# Patient Record
Sex: FEMALE | Race: WHITE | NOT HISPANIC OR LATINO | Employment: UNEMPLOYED | URBAN - METROPOLITAN AREA
[De-identification: names, ages, dates, MRNs, and addresses within clinical notes are randomized per-mention and may not be internally consistent; named-entity substitution may affect disease eponyms.]

---

## 2017-01-27 ENCOUNTER — HOSPITAL ENCOUNTER (EMERGENCY)
Facility: MEDICAL CENTER | Age: 57
End: 2017-01-27
Attending: EMERGENCY MEDICINE
Payer: MEDICAID

## 2017-01-27 VITALS
HEIGHT: 65 IN | OXYGEN SATURATION: 95 % | DIASTOLIC BLOOD PRESSURE: 49 MMHG | TEMPERATURE: 98.6 F | HEART RATE: 75 BPM | SYSTOLIC BLOOD PRESSURE: 128 MMHG | RESPIRATION RATE: 18 BRPM | BODY MASS INDEX: 37.47 KG/M2 | WEIGHT: 224.87 LBS

## 2017-01-27 DIAGNOSIS — I82.4Y2 ACUTE DEEP VEIN THROMBOSIS (DVT) OF PROXIMAL VEIN OF LEFT LOWER EXTREMITY (HCC): ICD-10-CM

## 2017-01-27 LAB
ANION GAP SERPL CALC-SCNC: 5 MMOL/L (ref 0–11.9)
ANISOCYTOSIS BLD QL SMEAR: ABNORMAL
BASOPHILS # BLD AUTO: 0.3 % (ref 0–1.8)
BASOPHILS # BLD: 0.03 K/UL (ref 0–0.12)
BUN SERPL-MCNC: 11 MG/DL (ref 8–22)
CALCIUM SERPL-MCNC: 8.6 MG/DL (ref 8.5–10.5)
CHLORIDE SERPL-SCNC: 106 MMOL/L (ref 96–112)
CO2 SERPL-SCNC: 27 MMOL/L (ref 20–33)
COMMENT 1642: NORMAL
CREAT SERPL-MCNC: 0.62 MG/DL (ref 0.5–1.4)
EOSINOPHIL # BLD AUTO: 0.21 K/UL (ref 0–0.51)
EOSINOPHIL NFR BLD: 2.2 % (ref 0–6.9)
ERYTHROCYTE [DISTWIDTH] IN BLOOD BY AUTOMATED COUNT: 46.8 FL (ref 35.9–50)
GFR SERPL CREATININE-BSD FRML MDRD: >60 ML/MIN/1.73 M 2
GLUCOSE SERPL-MCNC: 95 MG/DL (ref 65–99)
HCT VFR BLD AUTO: 31.2 % (ref 37–47)
HGB BLD-MCNC: 9.3 G/DL (ref 12–16)
IMM GRANULOCYTES # BLD AUTO: 0.03 K/UL (ref 0–0.11)
IMM GRANULOCYTES NFR BLD AUTO: 0.3 % (ref 0–0.9)
INR PPP: 0.99 (ref 0.87–1.13)
LYMPHOCYTES # BLD AUTO: 1.45 K/UL (ref 1–4.8)
LYMPHOCYTES NFR BLD: 15.2 % (ref 22–41)
MCH RBC QN AUTO: 21 PG (ref 27–33)
MCHC RBC AUTO-ENTMCNC: 29.8 G/DL (ref 33.6–35)
MCV RBC AUTO: 70.6 FL (ref 81.4–97.8)
MICROCYTES BLD QL SMEAR: ABNORMAL
MONOCYTES # BLD AUTO: 0.43 K/UL (ref 0–0.85)
MONOCYTES NFR BLD AUTO: 4.5 % (ref 0–13.4)
MORPHOLOGY BLD-IMP: NORMAL
NEUTROPHILS # BLD AUTO: 7.38 K/UL (ref 2–7.15)
NEUTROPHILS NFR BLD: 77.5 % (ref 44–72)
NRBC # BLD AUTO: 0 K/UL
NRBC BLD AUTO-RTO: 0 /100 WBC
PLATELET # BLD AUTO: 230 K/UL (ref 164–446)
PLATELET BLD QL SMEAR: NORMAL
PMV BLD AUTO: 10.7 FL (ref 9–12.9)
POTASSIUM SERPL-SCNC: 3.6 MMOL/L (ref 3.6–5.5)
PROTHROMBIN TIME: 13.4 SEC (ref 12–14.6)
RBC # BLD AUTO: 4.42 M/UL (ref 4.2–5.4)
RBC BLD AUTO: PRESENT
SODIUM SERPL-SCNC: 138 MMOL/L (ref 135–145)
WBC # BLD AUTO: 9.5 K/UL (ref 4.8–10.8)

## 2017-01-27 PROCEDURE — 85610 PROTHROMBIN TIME: CPT

## 2017-01-27 PROCEDURE — 700102 HCHG RX REV CODE 250 W/ 637 OVERRIDE(OP): Performed by: EMERGENCY MEDICINE

## 2017-01-27 PROCEDURE — 93971 EXTREMITY STUDY: CPT

## 2017-01-27 PROCEDURE — 36415 COLL VENOUS BLD VENIPUNCTURE: CPT

## 2017-01-27 PROCEDURE — 93971 EXTREMITY STUDY: CPT | Mod: 26 | Performed by: SURGERY

## 2017-01-27 PROCEDURE — A9270 NON-COVERED ITEM OR SERVICE: HCPCS | Performed by: EMERGENCY MEDICINE

## 2017-01-27 PROCEDURE — 99284 EMERGENCY DEPT VISIT MOD MDM: CPT

## 2017-01-27 PROCEDURE — 80048 BASIC METABOLIC PNL TOTAL CA: CPT

## 2017-01-27 PROCEDURE — 85025 COMPLETE CBC W/AUTO DIFF WBC: CPT

## 2017-01-27 RX ORDER — TRAZODONE HYDROCHLORIDE 50 MG/1
100 TABLET ORAL DAILY
Status: SHIPPED | COMMUNITY
End: 2022-02-18 | Stop reason: SDUPTHER

## 2017-01-27 RX ORDER — SERTRALINE HYDROCHLORIDE 100 MG/1
100 TABLET, FILM COATED ORAL DAILY
COMMUNITY
End: 2018-01-19

## 2017-01-27 RX ADMIN — RIVAROXABAN 15 MG: 15 TABLET, FILM COATED ORAL at 18:05

## 2017-01-27 ASSESSMENT — PAIN SCALES - GENERAL: PAINLEVEL_OUTOF10: 5

## 2017-01-27 NOTE — ED PROVIDER NOTES
ED Provider Note    Scribed for Jabari Rodriguez M.D. by Margarita Perez. 1/27/2017, 1:45 PM.    Primary care provider: Jennifer Lee M.D.  Means of arrival: Walk-In  History obtained from: Patient  History limited by: None    CHIEF COMPLAINT  Chief Complaint   Patient presents with   • Leg Pain     left leg, +swelling, onset this am   • Post-Op Complications     hernia repair 4d ago   • Sent by MD     by Thania, r/o dvt       HPI  Luz Ahumada is a 57 y.o. female who presents to the Emergency Department sent by her MD for left leg pain with associated swelling, onset this morning.  The patient says that her leg gave out on her this morning when she got out of bed secondary to the pain. She denies any previous injuries to the legs. She is 4 days post hernia repair and spoke with Dr. Monteiro who told her to come to the ED for evaluate of a DVT. She denies any history of DVT. She is not on any blood thinners. She denies any chronic past medical history or daily medication.     REVIEW OF SYSTEMS  See HPI for further details. All other systems are negative.     PAST MEDICAL HISTORY       SURGICAL HISTORY   has past surgical history that includes gastric bypass laparoscopic (7/2/08); cholecystectomy; hernia repair; bowel resection laparoscopic (7/12/08); and ventral hernia repair laparoscopic (10/29/08).    SOCIAL HISTORY  Social History   Substance Use Topics   • Smoking status: Former Smoker   • Smokeless tobacco: None   • Alcohol Use: No      History   Drug Use No       FAMILY HISTORY  History reviewed. No pertinent family history.    CURRENT MEDICATIONS  No current facility-administered medications on file prior to encounter.     Current Outpatient Prescriptions on File Prior to Encounter   Medication Sig Dispense Refill   • albuterol (VENTOLIN OR PROVENTIL) 108 (90 BASE) MCG/ACT AERS Inhale 1-2 Puffs by mouth every 6 hours as needed for Shortness of Breath. 1 Inhaler 0   • sertraline (ZOLOFT) 50 MG TABS Take 50 mg  "by mouth every day.     • benzonatate (TESSALON PERLES) 100 MG CAPS Take 1 Cap by mouth 2 times a day as needed for Cough. 20 Cap 0   • oxycodone-acetaminophen (PERCOCET) 5-325 MG TABS Take 1-2 Tabs by mouth every four hours as needed for Mild Pain. 20 Each 0   Reviewed. See Encounter Summary.     ALLERGIES  Allergies   Allergen Reactions   • Hydrocodone-Acetaminophen Hives     Pt states \" I hallucinate \"        PHYSICAL EXAM  VITAL SIGNS: /49 mmHg  Pulse 77  Temp(Src) 37 °C (98.6 °F) (Temporal)  Resp 18  Ht 1.651 m (5' 5\")  Wt 102 kg (224 lb 13.9 oz)  BMI 37.42 kg/m2  SpO2 96%   Pulse ox interpretation: I interpret this pulse ox as normal.  Constitutional: Alert in no apparent distress.  HENT: No signs of trauma, Bilateral external ears normal, Nose normal.   Eyes: Pupils are equal and reactive, Conjunctiva normal, Non-icteric.   Neck: Normal range of motion, No tenderness, Supple, No stridor. No JVD.  Lymphatic: No lymphadenopathy noted.   Cardiovascular: Regular rate and rhythm, no murmurs.   Thorax & Lungs: Normal breath sounds, No respiratory distress, No wheezing, No chest tenderness.   Abdomen: Bowel sounds normal, Soft, No tenderness, No masses, No pulsatile masses. No peritoneal signs.  Skin: Warm, Dry, No erythema, No rash.   Back: No bony tenderness, No CVA tenderness.   Extremities: left calf tenderness to palpation. No obvious asymmetry. Intact distal pulses, No edema, No tenderness,   Musculoskeletal: Good range of motion in all major joints. No tenderness to palpation or major deformities noted.   Neurologic: Alert , Normal motor function, Normal sensory function, No focal deficits noted.   Psychiatric: Affect normal, Judgment normal, Mood normal.     DIAGNOSTIC STUDIES / PROCEDURES     LABS  Results for orders placed or performed during the hospital encounter of 01/27/17   CBC WITH DIFFERENTIAL   Result Value Ref Range    WBC 9.5 4.8 - 10.8 K/uL    RBC 4.42 4.20 - 5.40 M/uL    Hemoglobin " 9.3 (L) 12.0 - 16.0 g/dL    Hematocrit 31.2 (L) 37.0 - 47.0 %    MCV 70.6 (L) 81.4 - 97.8 fL    MCH 21.0 (L) 27.0 - 33.0 pg    MCHC 29.8 (L) 33.6 - 35.0 g/dL    RDW 46.8 35.9 - 50.0 fL    Platelet Count 230 164 - 446 K/uL    MPV 10.7 9.0 - 12.9 fL    Neutrophils-Polys 77.50 (H) 44.00 - 72.00 %    Lymphocytes 15.20 (L) 22.00 - 41.00 %    Monocytes 4.50 0.00 - 13.40 %    Eosinophils 2.20 0.00 - 6.90 %    Basophils 0.30 0.00 - 1.80 %    Immature Granulocytes 0.30 0.00 - 0.90 %    Nucleated RBC 0.00 /100 WBC    Neutrophils (Absolute) 7.38 (H) 2.00 - 7.15 K/uL    Lymphs (Absolute) 1.45 1.00 - 4.80 K/uL    Monos (Absolute) 0.43 0.00 - 0.85 K/uL    Eos (Absolute) 0.21 0.00 - 0.51 K/uL    Baso (Absolute) 0.03 0.00 - 0.12 K/uL    Immature Granulocytes (abs) 0.03 0.00 - 0.11 K/uL    NRBC (Absolute) 0.00 K/uL    Anisocytosis 1+     Microcytosis 1+    BASIC METABOLIC PANEL   Result Value Ref Range    Sodium 138 135 - 145 mmol/L    Potassium 3.6 3.6 - 5.5 mmol/L    Chloride 106 96 - 112 mmol/L    Co2 27 20 - 33 mmol/L    Glucose 95 65 - 99 mg/dL    Bun 11 8 - 22 mg/dL    Creatinine 0.62 0.50 - 1.40 mg/dL    Calcium 8.6 8.5 - 10.5 mg/dL    Anion Gap 5.0 0.0 - 11.9   PROTHROMBIN TIME   Result Value Ref Range    PT 13.4 12.0 - 14.6 sec    INR 0.99 0.87 - 1.13   ESTIMATED GFR   Result Value Ref Range    GFR If African American >60 >60 mL/min/1.73 m 2    GFR If Non African American >60 >60 mL/min/1.73 m 2   PERIPHERAL SMEAR REVIEW   Result Value Ref Range    Peripheral Smear Review see below    PLATELET ESTIMATE   Result Value Ref Range    Plt Estimation Normal    MORPHOLOGY   Result Value Ref Range    RBC Morphology Present    DIFFERENTIAL COMMENT   Result Value Ref Range    Comments-Diff see below    All labs were reviewed by me.    RADIOLOGY  LE VENOUS DUPLEX (DVT)   Preliminary Result      The radiologist's interpretation of all radiological studies have been reviewed by me.    COURSE & MEDICAL DECISION MAKING  Pertinent Labs &  Imaging studies reviewed. (See chart for details)    **Patient w/ LLE DVT after recent surgery. No cp/sob and low suspicion for PE. Creatinine stable. D/w surgeon and reports low risk for bleeding in regards to recent surgery. Will treat outpatient with Xarelto,however, very strict return instructions provided and patient verbalized understanding of risk of bleeding and concerning symptoms to return to ED for. Patient reports hx of anemia and no recent labs available. No hx of GI bleeding and no dark/blood per rectum. Advised to monitor stools for any sign of blood or black stools.     1:45 PM Patient seen and examined at bedside. Ordered for CBC with differential, BMP, and prothrombin time to evaluate. She was informed that an ultrasound of the leg will be ordered to evaluate for a DVT. She was further informed that general lab work will be ordered to further evaluate her symptoms. She understood and verbalized agreement.      4:51 PM - Ordered for LE venous duplex    5:05 PM Patient reevaluated and is resting comfortably. She had no further complaints at this time. She was informed that she does have a DVT and will need to be started on blood thinners. She was informed that Dr. Monteiro will be contacted to see if he would like her admitted.     5:08 PM Paged Dr. Monteiro, general surgery.      5:23 PM I spoke with Dr. Monteiro and informed him about the patient. He states to start the patient on blood thinners and that there is no risk for a bleed in regards to the surgery performed and she is able to be discharged home and to follow up with him in the office.    Patient left the ED without any active distress. Strict return instructions provided and urge immediate return to the ED with any concerning symptoms. Questions and concerns addressed with patient.    The patient will return for new or worsening symptoms and is stable at the time of discharge.    The patient is referred to a primary physician for blood pressure  management, diabetic screening, and for all other preventative health concerns.    DISPOSITION:  Patient will be discharged home in stable condition.    FOLLOW UP:  Celina Sanchez M.D.  [id number]  [address]  [place] [id number]  [phone]    In 2 days      OUTPATIENT MEDICATIONS:  New Prescriptions    RIVAROXABAN (XARELTO) 15 MG TAB TABLET    Take 1 Tab by mouth 2 times a day, with meals for 21 days.     FINAL IMPRESSION  1. Acute deep vein thrombosis (DVT) of proximal vein of left lower extremity (CMS-[facility])          Margarita SEARS (Scribe), am scribing for, and in the presence of, Jabari Rodriguez M.D..    Electronically signed by: Margarita Perez (Jesicaibchio), 1/27/2017    Jabari SEARS M.D. personally performed the services described in this documentation, as scribed by Margarita Perez in my presence, and it is both accurate and complete.    The note accurately reflects work and decisions made by me.  Jabari Rodriguez  1/27/2017  7:20 PM

## 2017-01-27 NOTE — ED AVS SNAPSHOT
After Visit Summary                                                                                                                Luz Ahumada   MRN: [id number]    Department:  [facility], Emergency Dept   Date of Visit:  1/27/2017            [facility], Emergency Dept    [address]    [place] [address]    Phone:  [phone]      You were seen by     Jabari Rodriguez M.D.      Your Diagnosis Was     Acute deep vein thrombosis (DVT) of proximal vein of left lower extremity (CMS-HCC)     I82.4Y2       These are the medications you received during your hospitalization from 01/27/2017 1120 to 01/27/2017 1811     Date/Time Order Dose Route Action    01/27/2017 1805 rivaroxaban (XARELTO) tablet 15 mg 15 mg Oral Given      Follow-up Information     1. Follow up with Celina Sanchez M.D. In 2 days.    Specialty:  Family Medicine    Contact information    [id number]  [place] [address]  [phone]        Medication Information     Review all of your home medications and newly ordered medications with your primary doctor and/or pharmacist as soon as possible. Follow medication instructions as directed by your doctor and/or pharmacist.     Please keep your complete medication list with you and share with your physician. Update the information when medications are discontinued, doses are changed, or new medications (including over-the-counter products) are added; and carry medication information at all times in the event of emergency situations.               Medication List      START taking these medications        Instructions    rivaroxaban 15 MG Tabs tablet   Commonly known as:  XARELTO    Take 1 Tab by mouth 2 times a day, with meals for 21 days.   Dose:  15 mg         ASK your doctor about these medications        Instructions    albuterol 108 (90 BASE) MCG/ACT Aers inhalation aerosol    Inhale 1-2 Puffs by mouth every 6 hours as needed for Shortness of  Breath.   Dose:  1-2 Puff       benzonatate 100 MG Caps   Commonly known as:  TESSALON PERLES    Take 1 Cap by mouth 2 times a day as needed for Cough.   Dose:  100 mg       oxycodone-acetaminophen 5-325 MG Tabs   Commonly known as:  PERCOCET    Take 1-2 Tabs by mouth every four hours as needed for Mild Pain.   Dose:  1-2 Tab       sertraline 100 MG Tabs   Commonly known as:  ZOLOFT    Take 100 mg by mouth every day.   Dose:  100 mg       trazodone 50 MG Tabs   Commonly known as:  DESYREL    Take 100 mg by mouth every day.   Dose:  100 mg               Procedures and tests performed during your visit     BASIC METABOLIC PANEL    CBC WITH DIFFERENTIAL    DIFFERENTIAL COMMENT    ESTIMATED GFR    LE VENOUS DUPLEX (DVT)    MORPHOLOGY    PERIPHERAL SMEAR REVIEW    PLATELET ESTIMATE    PROTHROMBIN TIME        Discharge Instructions       Return to the Emergency Department with any chest pain, shortness of breath, abdominal pain or any sign of bleeding.    Deep Vein Thrombosis  A deep vein thrombosis (DVT) is a blood clot that develops in the deep, larger veins of the leg, arm, or pelvis. These are more dangerous than clots that might form in veins near the surface of the body. A DVT can lead to serious and even life-threatening complications if the clot breaks off and travels in the bloodstream to the lungs.   A DVT can damage the valves in your leg veins so that instead of flowing upward, the blood pools in the lower leg. This is called post-thrombotic syndrome, and it can result in pain, swelling, discoloration, and sores on the leg.  CAUSES  Usually, several things contribute to the formation of blood clots. Contributing factors include:  · The flow of blood slows down.  · The inside of the vein is damaged in some way.  · You have a condition that makes blood clot more easily.  RISK FACTORS  Some people are more likely than others to develop blood clots. Risk factors include:   · Smoking.  · Being overweight  (obese).  · Sitting or lying still for a long time. This includes long-distance travel, paralysis, or recovery from an illness or surgery.  Other factors that increase risk are:   · Older age, especially over 75 years of age.  · Having a family history of blood clots or if you have already had a blot clot.  · Having major or lengthy surgery. This is especially true for surgery on the hip, knee, or belly (abdomen). Hip surgery is particularly high risk.  · Having a long, thin tube (catheter) placed inside a vein during a medical procedure.  · Breaking a hip or leg.  · Having cancer or cancer treatment.  · Pregnancy and childbirth.  ¨ Hormone changes make the blood clot more easily during pregnancy.  ¨ The fetus puts pressure on the veins of the pelvis.  ¨ There is a risk of injury to veins during delivery or a caesarean delivery. The risk is highest just after childbirth.  · Medicines containing the female hormone estrogen. This includes birth control pills and hormone replacement therapy.  · Other circulation or heart problems.    SIGNS AND SYMPTOMS  When a clot forms, it can either partially or totally block the blood flow in that vein. Symptoms of a DVT can include:  · Swelling of the leg or arm, especially if one side is much worse.  · Warmth and redness of the leg or arm, especially if one side is much worse.  · Pain in an arm or leg. If the clot is in the leg, symptoms may be more noticeable or worse when standing or walking.  The symptoms of a DVT that has traveled to the lungs (pulmonary embolism, PE) usually start suddenly and include:  · Shortness of breath.  · Coughing.  · Coughing up blood or blood-tinged mucus.  · Chest pain. The chest pain is often worse with deep breaths.  · Rapid heartbeat.  Anyone with these symptoms should get emergency medical treatment right away. Do not wait to see if the symptoms will go away. Call your local emergency services (911 in the [place] if you have these symptoms. Do  not drive yourself to the hospital.  DIAGNOSIS  If a DVT is suspected, your health care provider will take a full medical history and perform a physical exam. Tests that also may be required include:  · Blood tests, including studies of the clotting properties of the blood.  · Ultrasound to see if you have clots in your legs or lungs.  · X-rays to show the flow of blood when dye is injected into the veins (venogram).  · Studies of your lungs if you have any chest symptoms.  PREVENTION  · Exercise the legs regularly. Take a brisk 30-minute walk every day.  · Maintain a weight that is appropriate for your height.  · Avoid sitting or lying in bed for long periods of time without moving your legs.  · Women, particularly those over the age of 35 years, should consider the risks and benefits of taking estrogen medicines, including birth control pills.  · Do not smoke, especially if you take estrogen medicines.  · Long-distance travel can increase your risk of DVT. You should exercise your legs by walking or pumping the muscles every hour.  · Many of the risk factors above relate to situations that exist with hospitalization, either for illness, injury, or elective surgery. Prevention may include medical and nonmedical measures.  ¨ Your health care provider will assess you for the need for venous thromboembolism prevention when you are admitted to the hospital. If you are having surgery, your surgeon will assess you the day of or day after surgery.  TREATMENT  Once identified, a DVT can be treated. It can also be prevented in some circumstances. Once you have had a DVT, you may be at increased risk for a DVT in the future. The most common treatment for DVT is blood-thinning (anticoagulant) medicine, which reduces the blood's tendency to clot. Anticoagulants can stop new blood clots from forming and stop old clots from growing. They cannot dissolve existing clots. Your body does this by itself over time. Anticoagulants can  be given by mouth, through an IV tube, or by injection. Your health care provider will determine the best program for you. Other medicines or treatments that may be used are:  · Heparin or related medicines (low molecular weight heparin) are often the first treatment for a blood clot. They act quickly. However, they cannot be taken orally and must be given either in shot form or by IV tube.  ¨ Heparin can cause a fall in a component of blood that stops bleeding and forms blood clots (platelets). You will be monitored with blood tests to be sure this does not occur.  · Warfarin is an anticoagulant that can be swallowed. It takes a few days to start working, so usually heparin or related medicines are used in combination. Once warfarin is working, heparin is usually stopped.  · Factor Xa inhibitor medicines, such as rivaroxaban and apixaban, also reduce blood clotting. These medicines are taken orally and can often be used without heparin or related medicines.  · Less commonly, clot dissolving drugs (thrombolytics) are used to dissolve a DVT. They carry a high risk of bleeding, so they are used mainly in severe cases where your life or a part of your body is threatened.  · Very rarely, a blood clot in the leg needs to be removed surgically.  · If you are unable to take anticoagulants, your health care provider may arrange for you to have a filter placed in a main vein in your abdomen. This filter prevents clots from traveling to your lungs.  HOME CARE INSTRUCTIONS  · Take all medicines as directed by your health care provider.  · Learn as much as you can about DVT.  · Wear a medical alert bracelet or carry a medical alert card.  · Ask your health care provider how soon you can go back to normal activities. It is important to stay active to prevent blood clots. If you are on anticoagulant medicine, avoid contact sports.  · It is very important to exercise. This is especially important while traveling, sitting, or  standing for long periods of time. Exercise your legs by walking or by tightening and relaxing your leg muscles regularly. Take frequent walks.  · You may need to wear compression stockings. These are tight elastic stockings that apply pressure to the lower legs. This pressure can help keep the blood in the legs from clotting.  Taking Warfarin  Warfarin is a daily medicine that is taken by mouth. Your health care provider will advise you on the length of treatment (usually 3-6 months, sometimes lifelong). If you take warfarin:  · Understand how to take warfarin and foods that can affect how warfarin works in your body.  · Too much and too little warfarin are both dangerous. Too much warfarin increases the risk of bleeding. Too little warfarin continues to allow the risk for blood clots.  Warfarin and Regular Blood Testing  While taking warfarin, you will need to have regular blood tests to measure your blood clotting time. These blood tests usually include both the prothrombin time (PT) and international normalized ratio (INR) tests. The PT and INR results allow your health care provider to adjust your dose of warfarin. It is very important that you have your PT and INR tested as often as directed by your health care provider.     Warfarin and Your Diet  Avoid major changes in your diet, or notify your health care provider before changing your diet. Arrange a visit with a registered dietitian to answer your questions. Many foods, especially foods high in vitamin K, can interfere with warfarin and affect the PT and INR results. You should eat a consistent amount of foods high in vitamin K. Foods high in vitamin K include:   · Spinach, kale, broccoli, cabbage, suzi and turnip greens, [place] sprouts, peas, cauliflower, seaweed, and parsley.  · Beef and pork liver.  · Green tea.  · Soybean oil.  Warfarin with Other Medicines  Many medicines can interfere with warfarin and affect the PT and INR results. You  must:  · Tell your health care provider about any and all medicines, vitamins, and supplements you take, including aspirin and other over-the-counter anti-inflammatory medicines. Be especially cautious with aspirin and anti-inflammatory medicines. Ask your health care provider before taking these.  · Do not take or discontinue any prescribed or over-the-counter medicine except on the advice of your health care provider or pharmacist.  Warfarin Side Effects  Warfarin can have side effects, such as easy bruising and difficulty stopping bleeding. Ask your health care provider or pharmacist about other side effects of warfarin. You will need to:  · Hold pressure over cuts for longer than usual.  · Notify your dentist and other health care providers that you are taking warfarin before you undergo any procedures where bleeding may occur.  Warfarin with Alcohol and Tobacco   · Drinking alcohol frequently can increase the effect of warfarin, leading to excess bleeding. It is best to avoid alcoholic drinks or to consume only very small amounts while taking warfarin. Notify your health care provider if you change your alcohol intake.    · Do not use any tobacco products including cigarettes, chewing tobacco, or electronic cigarettes. If you smoke, quit. Ask your health care provider for help with quitting smoking.  Alternative Medicines to Warfarin: Factor Xa Inhibitor Medicines  · These blood-thinning medicines are taken by mouth, usually for several weeks or longer. It is important to take the medicine every single day at the same time each day.  · There are no regular blood tests required when using these medicines.  · There are fewer food and drug interactions than with warfarin.  · The side effects of this class of medicine are similar to those of warfarin, including excessive bruising or bleeding. Ask your health care provider or pharmacist about other potential side effects.  SEEK MEDICAL CARE IF:  · You notice a rapid  heartbeat.  · You feel weaker or more tired than usual.  · You feel faint.  · You notice increased bruising.  · You feel your symptoms are not getting better in the time expected.  · You believe you are having side effects of medicine.  SEEK IMMEDIATE MEDICAL CARE IF:  · You have chest pain.  · You have trouble breathing.  · You have new or increased swelling or pain in one leg.  · You cough up blood.  · You notice blood in vomit, in a bowel movement, or in urine.  MAKE SURE YOU:  · Understand these instructions.  · Will watch your condition.  · Will get help right away if you are not doing well or get worse.     This information is not intended to replace advice given to you by your health care provider. Make sure you discuss any questions you have with your health care provider.     Document Released: 12/18/2006 Document Revised: 01/08/2016 Document Reviewed: 04/13/2016  [employer] Patient Education ©2016 [employer]            Patient Information     Patient Information    Following emergency treatment: all patient requiring follow-up care must return either to a private physician or a clinic if your condition worsens before you are able to obtain further medical attention, please return to the emergency room.     Billing Information    At [facility], we work to make the billing process streamlined for our patients.  Our Representatives are here to answer any questions you may have regarding your hospital bill.  If you have insurance coverage and have supplied your insurance information to us, we will submit a claim to your insurer on your behalf.  Should you have any questions regarding your bill, we can be reached online or by phone as follows:  Online: You are able pay your bills online or live chat with our representatives about any billing questions you may have. We are here to help Monday - Friday from 8:00am to 7:30pm and 9:00am - 12:00pm on Saturdays.  Please visit  [email or web address]  for more information.   Phone:  [phone] or [phone]    Please note that your emergency physician, surgeon, pathologist, radiologist, anesthesiologist, and other specialists are not employed by [facility] and will therefore bill separately for their services.  Please contact them directly for any questions concerning their bills at the numbers below:     Emergency Physician Services:  [phone]  [facility]:  [phone]  [facility]:  [phone]  [facility]:  [phone]    1. Your final bill may vary from the amount quoted upon discharge if all procedures are not complete at that time, or if your doctor has additional procedures of which we are not aware. You will receive an additional bill if you return to the Emergency Department at [facility] for suture removal regardless of the facility of which the sutures were placed.     2. Please arrange for settlement of this account at the emergency registration.    3. All self-pay accounts are due in full at the time of treatment.  If you are unable to meet this obligation then payment is expected within 4-5 days.     4. If you have had radiology studies (CT, X-ray, Ultrasound, MRI), you have received a preliminary result during your emergency department visit. Please contact the radiology department [phone] to receive a copy of your final result. Please discuss the Final result with your primary physician or with the follow up physician provided.     Crisis Hotline:  [place] Crisis Hotline:  3-498-LYZIOVC or 1-797.633.1421  [place] Crisis Hotline:    1-839.617.5725 or 334-190-5649         ED Discharge Follow Up Questions    1. In order to provide you with very good care, we would like to follow up with a phone call in the next few days.  May we have your permission to contact you?     YES /  NO    2. What is the best phone number to call  you? (       )_____-__________    3. What is the best time to call you?      Morning  /  Afternoon  /  Evening                   Patient Signature:  ____________________________________________________________    Date:  ____________________________________________________________

## 2017-01-27 NOTE — ED NOTES
Pt to triage in WC.  Chief Complaint   Patient presents with   • Leg Pain     left leg, +swelling, onset this am   • Post-Op Complications     hernia repair 4d ago   • Sent by MD     by Thania, r/o dvt     Left foot warm +pedal pulse.  Pt asked to wait in lobby. Advised of wait time and triage process. Advised to alert RN w/ any changes in condition. Thanked for patience.

## 2017-01-27 NOTE — ED NOTES
Med rec reviewed and complete per pt at bedside.  Allergies reviewed.  Pt states she finished a Z-cooper 10 days ago.

## 2017-01-27 NOTE — ED NOTES
".  Chief Complaint   Patient presents with   • Leg Pain     left leg, +swelling, onset this am   • Post-Op Complications     hernia repair 4d ago   • Sent by MD     by Thania, r/o dvt   Sudden onset this morning at 0900. Pain below left knee A/P. Pt not able to bear weight LLE. Pt states \" it just feels really tender.\" + DP pulse noted. Pt reports distal numbness/tingling to LLE. No chest pain. No difficulty breathing.     "

## 2017-01-27 NOTE — ED AVS SNAPSHOT
1/27/2017          Luz Ahumada  [address]  [place] [address]    Dear Luz:    [facility] wants to ensure your discharge home is safe and you or your loved ones have had all your questions answered regarding your care after you leave the hospital.    You may receive a telephone call within two days of your discharge.  This call is to make certain you understand your discharge instructions as well as ensure we provided you with the best care possible during your stay with us.     The call will only last approximately 3-5 minutes and will be done by a nurse.    Once again, we want to ensure your discharge home is safe and that you have a clear understanding of any next steps in your care.  If you have any questions or concerns, please do not hesitate to contact us, we are here for you.  Thank you for choosing [facility] for your healthcare needs.    Sincerely,    Ranjit Kenny    [facility] [place]

## 2017-01-27 NOTE — ED AVS SNAPSHOT
[employer] Access Code: [email or web address]  Expires: 2/26/2017  6:11 PM    Your email address is not on file at [facility].  Email Addresses are required for you to sign up for [employer], please contact [phone] to verify your personal information and to provide your email address prior to attempting to register for [employer]-um[employer].    Luz Ahumada  [address]  [place] [address]    H-umust  A secure, online tool to manage your health information     [facility]’s [employer]® is a secure, online tool that connects you to your personalized health information from the privacy of your home -- day or night - making it very easy for you to manage your healthcare. Once the activation process is completed, you can even access your medical information using the [employer] laura, which is available for free in the Apple Laura store or Google Play store.     To learn more about [employer], visit [email or web address]    There are two levels of access available (as shown below):   My Chart Features  [facility] Primary Care Doctor [facility]  Specialists [facility]  Urgent  Care Non-[facility] Primary Care Doctor   Email your healthcare team securely and privately 24/7 X X X    Manage appointments: schedule your next appointment; view details of past/upcoming appointments X      Request prescription refills. X      View recent personal medical records, including lab and immunizations X X X X   View health record, including health history, allergies, medications X X X X   Read reports about your outpatient visits, procedures, consult and ER notes X X X X   See your discharge summary, which is a recap of your hospital and/or ER visit that includes your diagnosis, lab results, and care plan X X  X     How to register for H-umust:  Once your e-mail address has been verified, follow the following steps to sign up for H-umust.     1. Go to  [email or web address]  2. Click on the Sign Up Now box, which takes you to the New Member Sign Up  page. You will need to provide the following information:  a. Enter your [employer] Access Code exactly as it appears at the top of this page. (You will not need to use this code after you’ve completed the sign-up process. If you do not sign up before the expiration date, you must request a new code.)   b. Enter your date of birth.   c. Enter your home email address.   d. Click Submit, and follow the next screen’s instructions.  3. Create a [employer] ID. This will be your [employer] login ID and cannot be changed, so think of one that is secure and easy to remember.  4. Create a [employer] password. You can change your password at any time.  5. Enter your Password Reset Question and Answer. This can be used at a later time if you forget your password.   6. Enter your e-mail address. This allows you to receive e-mail notifications when new information is available in [employer].  7. Click Sign Up. You can now view your health information.    For assistance activating your [employer] account, call [phone]

## 2017-01-28 NOTE — ED NOTES
All lines and monitors disconnected.  Discharge instructions reviewed, questions answered.  Pt to lobby via wheelchair, escorted by RN.  Pt states all belongings in possession.

## 2017-01-28 NOTE — DISCHARGE INSTRUCTIONS
Return to the Emergency Department with any chest pain, shortness of breath, abdominal pain or any sign of bleeding.    Deep Vein Thrombosis  A deep vein thrombosis (DVT) is a blood clot that develops in the deep, larger veins of the leg, arm, or pelvis. These are more dangerous than clots that might form in veins near the surface of the body. A DVT can lead to serious and even life-threatening complications if the clot breaks off and travels in the bloodstream to the lungs.   A DVT can damage the valves in your leg veins so that instead of flowing upward, the blood pools in the lower leg. This is called post-thrombotic syndrome, and it can result in pain, swelling, discoloration, and sores on the leg.  CAUSES  Usually, several things contribute to the formation of blood clots. Contributing factors include:  · The flow of blood slows down.  · The inside of the vein is damaged in some way.  · You have a condition that makes blood clot more easily.  RISK FACTORS  Some people are more likely than others to develop blood clots. Risk factors include:   · Smoking.  · Being overweight (obese).  · Sitting or lying still for a long time. This includes long-distance travel, paralysis, or recovery from an illness or surgery.  Other factors that increase risk are:   · Older age, especially over 75 years of age.  · Having a family history of blood clots or if you have already had a blot clot.  · Having major or lengthy surgery. This is especially true for surgery on the hip, knee, or belly (abdomen). Hip surgery is particularly high risk.  · Having a long, thin tube (catheter) placed inside a vein during a medical procedure.  · Breaking a hip or leg.  · Having cancer or cancer treatment.  · Pregnancy and childbirth.  ¨ Hormone changes make the blood clot more easily during pregnancy.  ¨ The fetus puts pressure on the veins of the pelvis.  ¨ There is a risk of injury to veins during delivery or a caesarean delivery. The risk is  highest just after childbirth.  · Medicines containing the female hormone estrogen. This includes birth control pills and hormone replacement therapy.  · Other circulation or heart problems.    SIGNS AND SYMPTOMS  When a clot forms, it can either partially or totally block the blood flow in that vein. Symptoms of a DVT can include:  · Swelling of the leg or arm, especially if one side is much worse.  · Warmth and redness of the leg or arm, especially if one side is much worse.  · Pain in an arm or leg. If the clot is in the leg, symptoms may be more noticeable or worse when standing or walking.  The symptoms of a DVT that has traveled to the lungs (pulmonary embolism, PE) usually start suddenly and include:  · Shortness of breath.  · Coughing.  · Coughing up blood or blood-tinged mucus.  · Chest pain. The chest pain is often worse with deep breaths.  · Rapid heartbeat.  Anyone with these symptoms should get emergency medical treatment right away. Do not wait to see if the symptoms will go away. Call your local emergency services (911 in the [place] if you have these symptoms. Do not drive yourself to the hospital.  DIAGNOSIS  If a DVT is suspected, your health care provider will take a full medical history and perform a physical exam. Tests that also may be required include:  · Blood tests, including studies of the clotting properties of the blood.  · Ultrasound to see if you have clots in your legs or lungs.  · X-rays to show the flow of blood when dye is injected into the veins (venogram).  · Studies of your lungs if you have any chest symptoms.  PREVENTION  · Exercise the legs regularly. Take a brisk 30-minute walk every day.  · Maintain a weight that is appropriate for your height.  · Avoid sitting or lying in bed for long periods of time without moving your legs.  · Women, particularly those over the age of 35 years, should consider the risks and benefits of taking estrogen medicines, including birth control  pills.  · Do not smoke, especially if you take estrogen medicines.  · Long-distance travel can increase your risk of DVT. You should exercise your legs by walking or pumping the muscles every hour.  · Many of the risk factors above relate to situations that exist with hospitalization, either for illness, injury, or elective surgery. Prevention may include medical and nonmedical measures.  ¨ Your health care provider will assess you for the need for venous thromboembolism prevention when you are admitted to the hospital. If you are having surgery, your surgeon will assess you the day of or day after surgery.  TREATMENT  Once identified, a DVT can be treated. It can also be prevented in some circumstances. Once you have had a DVT, you may be at increased risk for a DVT in the future. The most common treatment for DVT is blood-thinning (anticoagulant) medicine, which reduces the blood's tendency to clot. Anticoagulants can stop new blood clots from forming and stop old clots from growing. They cannot dissolve existing clots. Your body does this by itself over time. Anticoagulants can be given by mouth, through an IV tube, or by injection. Your health care provider will determine the best program for you. Other medicines or treatments that may be used are:  · Heparin or related medicines (low molecular weight heparin) are often the first treatment for a blood clot. They act quickly. However, they cannot be taken orally and must be given either in shot form or by IV tube.  ¨ Heparin can cause a fall in a component of blood that stops bleeding and forms blood clots (platelets). You will be monitored with blood tests to be sure this does not occur.  · Warfarin is an anticoagulant that can be swallowed. It takes a few days to start working, so usually heparin or related medicines are used in combination. Once warfarin is working, heparin is usually stopped.  · Factor Xa inhibitor medicines, such as rivaroxaban and apixaban,  also reduce blood clotting. These medicines are taken orally and can often be used without heparin or related medicines.  · Less commonly, clot dissolving drugs (thrombolytics) are used to dissolve a DVT. They carry a high risk of bleeding, so they are used mainly in severe cases where your life or a part of your body is threatened.  · Very rarely, a blood clot in the leg needs to be removed surgically.  · If you are unable to take anticoagulants, your health care provider may arrange for you to have a filter placed in a main vein in your abdomen. This filter prevents clots from traveling to your lungs.  HOME CARE INSTRUCTIONS  · Take all medicines as directed by your health care provider.  · Learn as much as you can about DVT.  · Wear a medical alert bracelet or carry a medical alert card.  · Ask your health care provider how soon you can go back to normal activities. It is important to stay active to prevent blood clots. If you are on anticoagulant medicine, avoid contact sports.  · It is very important to exercise. This is especially important while traveling, sitting, or standing for long periods of time. Exercise your legs by walking or by tightening and relaxing your leg muscles regularly. Take frequent walks.  · You may need to wear compression stockings. These are tight elastic stockings that apply pressure to the lower legs. This pressure can help keep the blood in the legs from clotting.  Taking Warfarin  Warfarin is a daily medicine that is taken by mouth. Your health care provider will advise you on the length of treatment (usually 3-6 months, sometimes lifelong). If you take warfarin:  · Understand how to take warfarin and foods that can affect how warfarin works in your body.  · Too much and too little warfarin are both dangerous. Too much warfarin increases the risk of bleeding. Too little warfarin continues to allow the risk for blood clots.  Warfarin and Regular Blood Testing  While taking warfarin,  you will need to have regular blood tests to measure your blood clotting time. These blood tests usually include both the prothrombin time (PT) and international normalized ratio (INR) tests. The PT and INR results allow your health care provider to adjust your dose of warfarin. It is very important that you have your PT and INR tested as often as directed by your health care provider.     Warfarin and Your Diet  Avoid major changes in your diet, or notify your health care provider before changing your diet. Arrange a visit with a registered dietitian to answer your questions. Many foods, especially foods high in vitamin K, can interfere with warfarin and affect the PT and INR results. You should eat a consistent amount of foods high in vitamin K. Foods high in vitamin K include:   · Spinach, kale, broccoli, cabbage, suzi and turnip greens, [place] sprouts, peas, cauliflower, seaweed, and parsley.  · Beef and pork liver.  · Green tea.  · Soybean oil.  Warfarin with Other Medicines  Many medicines can interfere with warfarin and affect the PT and INR results. You must:  · Tell your health care provider about any and all medicines, vitamins, and supplements you take, including aspirin and other over-the-counter anti-inflammatory medicines. Be especially cautious with aspirin and anti-inflammatory medicines. Ask your health care provider before taking these.  · Do not take or discontinue any prescribed or over-the-counter medicine except on the advice of your health care provider or pharmacist.  Warfarin Side Effects  Warfarin can have side effects, such as easy bruising and difficulty stopping bleeding. Ask your health care provider or pharmacist about other side effects of warfarin. You will need to:  · Hold pressure over cuts for longer than usual.  · Notify your dentist and other health care providers that you are taking warfarin before you undergo any procedures where bleeding may occur.  Warfarin with Alcohol  and Tobacco   · Drinking alcohol frequently can increase the effect of warfarin, leading to excess bleeding. It is best to avoid alcoholic drinks or to consume only very small amounts while taking warfarin. Notify your health care provider if you change your alcohol intake.    · Do not use any tobacco products including cigarettes, chewing tobacco, or electronic cigarettes. If you smoke, quit. Ask your health care provider for help with quitting smoking.  Alternative Medicines to Warfarin: Factor Xa Inhibitor Medicines  · These blood-thinning medicines are taken by mouth, usually for several weeks or longer. It is important to take the medicine every single day at the same time each day.  · There are no regular blood tests required when using these medicines.  · There are fewer food and drug interactions than with warfarin.  · The side effects of this class of medicine are similar to those of warfarin, including excessive bruising or bleeding. Ask your health care provider or pharmacist about other potential side effects.  SEEK MEDICAL CARE IF:  · You notice a rapid heartbeat.  · You feel weaker or more tired than usual.  · You feel faint.  · You notice increased bruising.  · You feel your symptoms are not getting better in the time expected.  · You believe you are having side effects of medicine.  SEEK IMMEDIATE MEDICAL CARE IF:  · You have chest pain.  · You have trouble breathing.  · You have new or increased swelling or pain in one leg.  · You cough up blood.  · You notice blood in vomit, in a bowel movement, or in urine.  MAKE SURE YOU:  · Understand these instructions.  · Will watch your condition.  · Will get help right away if you are not doing well or get worse.     This information is not intended to replace advice given to you by your health care provider. Make sure you discuss any questions you have with your health care provider.     Document Released: 12/18/2006 Document Revised: 01/08/2016 Document  Reviewed: 04/13/2016  [employer] Interactive Patient Education ©2016 [employer]

## 2018-01-17 ENCOUNTER — APPOINTMENT (OUTPATIENT)
Dept: ADMISSIONS | Facility: MEDICAL CENTER | Age: 58
End: 2018-01-17
Payer: MEDICAID

## 2018-01-19 ENCOUNTER — APPOINTMENT (OUTPATIENT)
Dept: ADMISSIONS | Facility: MEDICAL CENTER | Age: 58
End: 2018-01-19
Attending: SURGERY
Payer: MEDICAID

## 2018-01-19 DIAGNOSIS — Z01.812 PRE-OPERATIVE LABORATORY EXAMINATION: ICD-10-CM

## 2018-01-19 DIAGNOSIS — Z01.810 PRE-OPERATIVE CARDIOVASCULAR EXAMINATION: ICD-10-CM

## 2018-01-19 LAB
EKG IMPRESSION: NORMAL
ERYTHROCYTE [DISTWIDTH] IN BLOOD BY AUTOMATED COUNT: 43 FL (ref 35.9–50)
HCT VFR BLD AUTO: 46.6 % (ref 37–47)
HGB BLD-MCNC: 15.2 G/DL (ref 12–16)
MCH RBC QN AUTO: 29.6 PG (ref 27–33)
MCHC RBC AUTO-ENTMCNC: 32.6 G/DL (ref 33.6–35)
MCV RBC AUTO: 90.8 FL (ref 81.4–97.8)
PLATELET # BLD AUTO: 218 K/UL (ref 164–446)
PMV BLD AUTO: 11.3 FL (ref 9–12.9)
RBC # BLD AUTO: 5.13 M/UL (ref 4.2–5.4)
WBC # BLD AUTO: 7.7 K/UL (ref 4.8–10.8)

## 2018-01-19 PROCEDURE — 36415 COLL VENOUS BLD VENIPUNCTURE: CPT

## 2018-01-19 PROCEDURE — 93010 ELECTROCARDIOGRAM REPORT: CPT | Performed by: INTERNAL MEDICINE

## 2018-01-19 PROCEDURE — 93005 ELECTROCARDIOGRAM TRACING: CPT

## 2018-01-19 PROCEDURE — 85027 COMPLETE CBC AUTOMATED: CPT

## 2018-01-22 ENCOUNTER — HOSPITAL ENCOUNTER (OUTPATIENT)
Facility: MEDICAL CENTER | Age: 58
End: 2018-01-24
Attending: SURGERY | Admitting: SURGERY
Payer: MEDICAID

## 2018-01-22 DIAGNOSIS — G89.18 POSTOPERATIVE PAIN: ICD-10-CM

## 2018-01-22 DIAGNOSIS — K43.2 RECURRENT VENTRAL HERNIA: ICD-10-CM

## 2018-01-22 PROCEDURE — 160009 HCHG ANES TIME/MIN: Performed by: SURGERY

## 2018-01-22 PROCEDURE — 160002 HCHG RECOVERY MINUTES (STAT): Performed by: SURGERY

## 2018-01-22 PROCEDURE — 96376 TX/PRO/DX INJ SAME DRUG ADON: CPT

## 2018-01-22 PROCEDURE — 500064 HCHG BINDER, 4-PANEL MED/LG: Performed by: SURGERY

## 2018-01-22 PROCEDURE — A9270 NON-COVERED ITEM OR SERVICE: HCPCS

## 2018-01-22 PROCEDURE — 160048 HCHG OR STATISTICAL LEVEL 1-5: Performed by: SURGERY

## 2018-01-22 PROCEDURE — 700102 HCHG RX REV CODE 250 W/ 637 OVERRIDE(OP)

## 2018-01-22 PROCEDURE — G0378 HOSPITAL OBSERVATION PER HR: HCPCS

## 2018-01-22 PROCEDURE — 700111 HCHG RX REV CODE 636 W/ 250 OVERRIDE (IP)

## 2018-01-22 PROCEDURE — 160038 HCHG SURGERY MINUTES - EA ADDL 1 MIN LEVEL 2: Performed by: SURGERY

## 2018-01-22 PROCEDURE — 160003 HCHG RECOVERY MINUTES (EXTENDED) STAT: Performed by: SURGERY

## 2018-01-22 PROCEDURE — 160027 HCHG SURGERY MINUTES - 1ST 30 MINS LEVEL 2: Performed by: SURGERY

## 2018-01-22 PROCEDURE — 700102 HCHG RX REV CODE 250 W/ 637 OVERRIDE(OP): Performed by: PHYSICIAN ASSISTANT

## 2018-01-22 PROCEDURE — A9270 NON-COVERED ITEM OR SERVICE: HCPCS | Performed by: PHYSICIAN ASSISTANT

## 2018-01-22 PROCEDURE — 700101 HCHG RX REV CODE 250

## 2018-01-22 PROCEDURE — 700105 HCHG RX REV CODE 258: Performed by: PHYSICIAN ASSISTANT

## 2018-01-22 PROCEDURE — C1781 MESH (IMPLANTABLE): HCPCS | Performed by: SURGERY

## 2018-01-22 PROCEDURE — 501838 HCHG SUTURE GENERAL: Performed by: SURGERY

## 2018-01-22 PROCEDURE — 96374 THER/PROPH/DIAG INJ IV PUSH: CPT

## 2018-01-22 PROCEDURE — 700111 HCHG RX REV CODE 636 W/ 250 OVERRIDE (IP): Performed by: PHYSICIAN ASSISTANT

## 2018-01-22 PROCEDURE — 160036 HCHG PACU - EA ADDL 30 MINS PHASE I: Performed by: SURGERY

## 2018-01-22 PROCEDURE — 160035 HCHG PACU - 1ST 60 MINS PHASE I: Performed by: SURGERY

## 2018-01-22 DEVICE — IMPLANTABLE DEVICE: Type: IMPLANTABLE DEVICE | Status: FUNCTIONAL

## 2018-01-22 RX ORDER — LORAZEPAM 2 MG/ML
.5-1 INJECTION INTRAMUSCULAR EVERY 4 HOURS PRN
Status: DISCONTINUED | OUTPATIENT
Start: 2018-01-22 | End: 2018-01-24 | Stop reason: HOSPADM

## 2018-01-22 RX ORDER — ONDANSETRON 2 MG/ML
4 INJECTION INTRAMUSCULAR; INTRAVENOUS EVERY 4 HOURS PRN
Status: DISCONTINUED | OUTPATIENT
Start: 2018-01-22 | End: 2018-01-24 | Stop reason: HOSPADM

## 2018-01-22 RX ORDER — BUPIVACAINE HYDROCHLORIDE AND EPINEPHRINE 5; 5 MG/ML; UG/ML
INJECTION, SOLUTION EPIDURAL; INTRACAUDAL; PERINEURAL
Status: DISCONTINUED | OUTPATIENT
Start: 2018-01-22 | End: 2018-01-22 | Stop reason: HOSPADM

## 2018-01-22 RX ORDER — BUDESONIDE 0.5 MG/2ML
500 INHALANT ORAL 2 TIMES DAILY
COMMUNITY

## 2018-01-22 RX ORDER — ENALAPRILAT 1.25 MG/ML
2.5 INJECTION INTRAVENOUS EVERY 6 HOURS PRN
Status: DISCONTINUED | OUTPATIENT
Start: 2018-01-22 | End: 2018-01-24 | Stop reason: HOSPADM

## 2018-01-22 RX ORDER — LIDOCAINE AND PRILOCAINE 25; 25 MG/G; MG/G
1 CREAM TOPICAL
Status: ACTIVE | OUTPATIENT
Start: 2018-01-22 | End: 2018-01-23

## 2018-01-22 RX ORDER — SODIUM CHLORIDE, SODIUM LACTATE, POTASSIUM CHLORIDE, CALCIUM CHLORIDE 600; 310; 30; 20 MG/100ML; MG/100ML; MG/100ML; MG/100ML
INJECTION, SOLUTION INTRAVENOUS CONTINUOUS
Status: DISCONTINUED | OUTPATIENT
Start: 2018-01-22 | End: 2018-01-24 | Stop reason: HOSPADM

## 2018-01-22 RX ORDER — ALBUTEROL SULFATE 90 UG/1
1-2 AEROSOL, METERED RESPIRATORY (INHALATION) 2 TIMES DAILY PRN
COMMUNITY

## 2018-01-22 RX ORDER — LIDOCAINE HYDROCHLORIDE 10 MG/ML
INJECTION, SOLUTION INFILTRATION; PERINEURAL
Status: COMPLETED
Start: 2018-01-22 | End: 2018-01-22

## 2018-01-22 RX ORDER — PROMETHAZINE HYDROCHLORIDE 25 MG/1
25 SUPPOSITORY RECTAL EVERY 6 HOURS PRN
Status: DISCONTINUED | OUTPATIENT
Start: 2018-01-22 | End: 2018-01-24 | Stop reason: HOSPADM

## 2018-01-22 RX ORDER — OXYCODONE HYDROCHLORIDE 10 MG/1
5-10 TABLET ORAL EVERY 4 HOURS PRN
Status: DISCONTINUED | OUTPATIENT
Start: 2018-01-22 | End: 2018-01-22

## 2018-01-22 RX ORDER — OXYCODONE HYDROCHLORIDE 10 MG/1
10 TABLET ORAL EVERY 4 HOURS PRN
Status: DISCONTINUED | OUTPATIENT
Start: 2018-01-22 | End: 2018-01-24 | Stop reason: HOSPADM

## 2018-01-22 RX ORDER — DIPHENHYDRAMINE HYDROCHLORIDE 50 MG/ML
25 INJECTION INTRAMUSCULAR; INTRAVENOUS EVERY 6 HOURS PRN
Status: DISCONTINUED | OUTPATIENT
Start: 2018-01-22 | End: 2018-01-24 | Stop reason: HOSPADM

## 2018-01-22 RX ORDER — HYDRALAZINE HYDROCHLORIDE 20 MG/ML
10 INJECTION INTRAMUSCULAR; INTRAVENOUS EVERY 6 HOURS PRN
Status: DISCONTINUED | OUTPATIENT
Start: 2018-01-22 | End: 2018-01-24 | Stop reason: HOSPADM

## 2018-01-22 RX ORDER — FAMOTIDINE 20 MG/1
20 TABLET, FILM COATED ORAL 2 TIMES DAILY
Status: DISCONTINUED | OUTPATIENT
Start: 2018-01-22 | End: 2018-01-24 | Stop reason: HOSPADM

## 2018-01-22 RX ORDER — HYDROMORPHONE HYDROCHLORIDE 2 MG/ML
INJECTION, SOLUTION INTRAMUSCULAR; INTRAVENOUS; SUBCUTANEOUS
Status: COMPLETED
Start: 2018-01-22 | End: 2018-01-22

## 2018-01-22 RX ORDER — BUDESONIDE 0.5 MG/2ML
0.5 INHALANT ORAL
Status: DISCONTINUED | OUTPATIENT
Start: 2018-01-22 | End: 2018-01-24 | Stop reason: HOSPADM

## 2018-01-22 RX ORDER — OXYCODONE HCL 5 MG/5 ML
SOLUTION, ORAL ORAL
Status: COMPLETED
Start: 2018-01-22 | End: 2018-01-22

## 2018-01-22 RX ORDER — HYDROMORPHONE HYDROCHLORIDE 2 MG/1
TABLET ORAL
Status: COMPLETED
Start: 2018-01-22 | End: 2018-01-22

## 2018-01-22 RX ORDER — ONDANSETRON 2 MG/ML
INJECTION INTRAMUSCULAR; INTRAVENOUS
Status: COMPLETED
Start: 2018-01-22 | End: 2018-01-22

## 2018-01-22 RX ORDER — MORPHINE SULFATE 4 MG/ML
1-4 INJECTION, SOLUTION INTRAMUSCULAR; INTRAVENOUS
Status: DISCONTINUED | OUTPATIENT
Start: 2018-01-22 | End: 2018-01-24 | Stop reason: HOSPADM

## 2018-01-22 RX ORDER — ALBUTEROL SULFATE 90 UG/1
1-2 AEROSOL, METERED RESPIRATORY (INHALATION) 2 TIMES DAILY PRN
Status: DISCONTINUED | OUTPATIENT
Start: 2018-01-22 | End: 2018-01-23

## 2018-01-22 RX ORDER — SERTRALINE HYDROCHLORIDE 100 MG/1
100 TABLET, FILM COATED ORAL DAILY
COMMUNITY
End: 2022-01-03 | Stop reason: SDUPTHER

## 2018-01-22 RX ORDER — DIPHENHYDRAMINE HCL 25 MG
25 TABLET ORAL EVERY 6 HOURS PRN
Status: DISCONTINUED | OUTPATIENT
Start: 2018-01-22 | End: 2018-01-24 | Stop reason: HOSPADM

## 2018-01-22 RX ORDER — OXYCODONE HYDROCHLORIDE 5 MG/1
5-10 TABLET ORAL EVERY 6 HOURS PRN
Qty: 30 TAB | Refills: 0 | Status: SHIPPED | OUTPATIENT
Start: 2018-01-22 | End: 2018-01-26

## 2018-01-22 RX ORDER — LIDOCAINE HYDROCHLORIDE 10 MG/ML
0.5 INJECTION, SOLUTION INFILTRATION; PERINEURAL
Status: ACTIVE | OUTPATIENT
Start: 2018-01-22 | End: 2018-01-23

## 2018-01-22 RX ORDER — OXYCODONE HYDROCHLORIDE 5 MG/1
5 TABLET ORAL EVERY 4 HOURS PRN
Status: DISCONTINUED | OUTPATIENT
Start: 2018-01-22 | End: 2018-01-24 | Stop reason: HOSPADM

## 2018-01-22 RX ADMIN — FENTANYL CITRATE 50 MCG: 50 INJECTION, SOLUTION INTRAMUSCULAR; INTRAVENOUS at 13:51

## 2018-01-22 RX ADMIN — ONDANSETRON 4 MG: 2 INJECTION INTRAMUSCULAR; INTRAVENOUS at 13:45

## 2018-01-22 RX ADMIN — FENTANYL CITRATE 50 MCG: 50 INJECTION, SOLUTION INTRAMUSCULAR; INTRAVENOUS at 13:42

## 2018-01-22 RX ADMIN — OXYCODONE HYDROCHLORIDE 10 MG: 5 SOLUTION ORAL at 14:00

## 2018-01-22 RX ADMIN — OXYCODONE HYDROCHLORIDE 10 MG: 10 TABLET ORAL at 18:05

## 2018-01-22 RX ADMIN — LIDOCAINE HYDROCHLORIDE 0.5 ML: 10 INJECTION, SOLUTION INFILTRATION; PERINEURAL at 10:12

## 2018-01-22 RX ADMIN — HYDROMORPHONE HYDROCHLORIDE 0.5 MG: 2 INJECTION INTRAMUSCULAR; INTRAVENOUS; SUBCUTANEOUS at 14:05

## 2018-01-22 RX ADMIN — OXYCODONE HYDROCHLORIDE 10 MG: 10 TABLET ORAL at 22:47

## 2018-01-22 RX ADMIN — FAMOTIDINE 20 MG: 20 TABLET, FILM COATED ORAL at 20:38

## 2018-01-22 RX ADMIN — MORPHINE SULFATE 4 MG: 4 INJECTION INTRAVENOUS at 17:19

## 2018-01-22 RX ADMIN — FAMOTIDINE 20 MG: 20 TABLET, FILM COATED ORAL at 17:18

## 2018-01-22 RX ADMIN — SODIUM CHLORIDE, POTASSIUM CHLORIDE, SODIUM LACTATE AND CALCIUM CHLORIDE: 600; 310; 30; 20 INJECTION, SOLUTION INTRAVENOUS at 18:44

## 2018-01-22 RX ADMIN — MORPHINE SULFATE 4 MG: 4 INJECTION INTRAVENOUS at 20:37

## 2018-01-22 RX ADMIN — SODIUM CHLORIDE, SODIUM LACTATE, POTASSIUM CHLORIDE, CALCIUM CHLORIDE: 600; 310; 30; 20 INJECTION, SOLUTION INTRAVENOUS at 10:12

## 2018-01-22 ASSESSMENT — PAIN SCALES - GENERAL
PAINLEVEL_OUTOF10: 8
PAINLEVEL_OUTOF10: 6
PAINLEVEL_OUTOF10: 5
PAINLEVEL_OUTOF10: 6
PAINLEVEL_OUTOF10: ASSUMED PAIN PRESENT
PAINLEVEL_OUTOF10: 8
PAINLEVEL_OUTOF10: 6
PAINLEVEL_OUTOF10: 6
PAINLEVEL_OUTOF10: 8
PAINLEVEL_OUTOF10: 6
PAINLEVEL_OUTOF10: 8
PAINLEVEL_OUTOF10: 0

## 2018-01-22 ASSESSMENT — LIFESTYLE VARIABLES
TOTAL SCORE: 0
HOW MANY TIMES IN THE PAST YEAR HAVE YOU HAD 5 OR MORE DRINKS IN A DAY: 0
ON A TYPICAL DAY WHEN YOU DRINK ALCOHOL HOW MANY DRINKS DO YOU HAVE: 2
CONSUMPTION TOTAL: NEGATIVE
HAVE PEOPLE ANNOYED YOU BY CRITICIZING YOUR DRINKING: NO
TOTAL SCORE: 0
EVER FELT BAD OR GUILTY ABOUT YOUR DRINKING: NO
ALCOHOL_USE: YES
TOTAL SCORE: 0
HAVE YOU EVER FELT YOU SHOULD CUT DOWN ON YOUR DRINKING: NO
AVERAGE NUMBER OF DAYS PER WEEK YOU HAVE A DRINK CONTAINING ALCOHOL: 1
EVER_SMOKED: YES
EVER HAD A DRINK FIRST THING IN THE MORNING TO STEADY YOUR NERVES TO GET RID OF A HANGOVER: NO

## 2018-01-22 NOTE — OR SURGEON
Immediate Post OP Note    PreOp Diagnosis: Recurrent incisional hernia    PostOp Diagnosis: Same    Procedure(s):  VENTRAL HERNIA REPAIR RECURRENT INCISIONAL - Wound Class: Clean    Surgeon(s):  John H Ganser, M.D.    Anesthesiologist/Type of Anesthesia:  Anesthesiologist: Jerod Jerome M.D./General    Surgical Staff:  Circulator: Coni De Leon R.N.  Relief Scrub: Sunshine Ryan  Scrub Person: Ronel Brito  First Assist: ROYCE Cook  Count Hunters: Kam Emerson R.N.    Specimens:  * No specimens in log *    Estimated Blood Loss: 0    Findings: 0    Complications: 0        1/22/2018 1:26 PM John H Ganser

## 2018-01-22 NOTE — OR NURSING
Pt up to ambulate around unit and used the bathroom w/o difficulty. Pt now eating crackers and drinking juice. Awaiting room assignment to be cleaned.

## 2018-01-22 NOTE — OP REPORT
DATE OF SERVICE:  01/22/2018    DATE OF OPERATION:  01/22/2018    PREOPERATIVE DIAGNOSIS:  Multiply recurrent incisional hernia.    POSTOPERATIVE DIAGNOSIS:  Multiply recurrent incisional hernia.    PROCEDURE:  Recurrent incisional hernia repair with mesh, 11 cm Ventrio ST.    SURGEON:  John Ganser, MD    ASSISTANT:  Conor Mariano PA-C    ANESTHESIA:  General.    ANESTHESIOLOGIST:  Jerod Jerome MD    INDICATIONS:  Patient is a 58-year-old female who has undergone a prior   incisional hernia repair in the mid portion of her abdomen with a large mesh   at the time of abdominoplasty.  She did have a recurrence at the apex that was   repaired arthroscopically.  Now, she presents with an enlarging bulge in the   lower midline and CT confirms hernia containing bowel.  Risks, benefits, and   alternatives to repair with mesh were outlined in detail.  All questions   answered and she wished to proceed.  Of note, the patient initially was   scheduled for robotic repair, but the robot was unavailable as it was broken.    FINDINGS:  Of note is that there were extensive adhesions and bowel adhesed to   old mesh and it was felt that the repair would not have been possible   robotically anyway.  Repair was carried out, both the old mesh and fascia   closed over the mesh.    DESCRIPTION OF PROCEDURE:  The patient was identified and general anesthetic   administered.  Her abdomen was prepped and draped in usual sterile fashion.    The mid portion of her abdominoplasty incision was opened and dissection   carried through the subcutaneous tissues.  Hernia sac was identified and   circumferentially dissected down to the fascia.  The sac was entered.  There   were extensive adhesions in her abdomen.  The fascia was circumferentially   dissected, being careful to avoid any injury to bowel.  The old mesh was   visible at the top and with the hernia; it was well incorporated other than   the inferior border.  There were dense  adhesions through the mesh and it was   elected to place the new mesh above the old to avoid any bowel injury.    An 11 cm Ventrio ST mesh was soaked in saline and inserted into the abdomen   and secured circumferentially with interrupted #1 Ethibond sutures.  The   fascia was then closed in the midline with interrupted #1 Ethibond sutures   incorporating the mesh posteriorly.  Hemostasis was assured.  Subcutaneous   tissue was approximated with 2-0 Vicryl, and the skin with a 4-0 Vicryl   subcuticular sutures.  Sterile dressings were applied and local anesthesia   0.5% Marcaine infiltrated.  The patient was returned to recovery in stable   condition.       ____________________________________     JOHN H. GANSER, MD [facility] / [facility]    DD:  01/22/2018 13:30:40  DT:  01/22/2018 15:08:50    D#:  [id number]  Job#:  [id number]    cc: NIKKY GUZMÁN MD, DESIRAE DAVIES MD

## 2018-01-23 PROCEDURE — 700111 HCHG RX REV CODE 636 W/ 250 OVERRIDE (IP): Performed by: PHYSICIAN ASSISTANT

## 2018-01-23 PROCEDURE — 94640 AIRWAY INHALATION TREATMENT: CPT

## 2018-01-23 PROCEDURE — 700102 HCHG RX REV CODE 250 W/ 637 OVERRIDE(OP): Performed by: PHYSICIAN ASSISTANT

## 2018-01-23 PROCEDURE — 96376 TX/PRO/DX INJ SAME DRUG ADON: CPT

## 2018-01-23 PROCEDURE — G0378 HOSPITAL OBSERVATION PER HR: HCPCS

## 2018-01-23 PROCEDURE — 96375 TX/PRO/DX INJ NEW DRUG ADDON: CPT

## 2018-01-23 PROCEDURE — A9270 NON-COVERED ITEM OR SERVICE: HCPCS | Performed by: PHYSICIAN ASSISTANT

## 2018-01-23 RX ORDER — ALBUTEROL SULFATE 90 UG/1
1-2 AEROSOL, METERED RESPIRATORY (INHALATION) 2 TIMES DAILY
Status: DISCONTINUED | OUTPATIENT
Start: 2018-01-23 | End: 2018-01-24 | Stop reason: HOSPADM

## 2018-01-23 RX ADMIN — OXYCODONE HYDROCHLORIDE 10 MG: 10 TABLET ORAL at 05:12

## 2018-01-23 RX ADMIN — BUDESONIDE 0.5 MG: 0.5 SUSPENSION RESPIRATORY (INHALATION) at 07:13

## 2018-01-23 RX ADMIN — MORPHINE SULFATE 4 MG: 4 INJECTION INTRAVENOUS at 10:42

## 2018-01-23 RX ADMIN — FAMOTIDINE 20 MG: 20 TABLET, FILM COATED ORAL at 20:34

## 2018-01-23 RX ADMIN — FAMOTIDINE 20 MG: 20 TABLET, FILM COATED ORAL at 09:11

## 2018-01-23 RX ADMIN — OXYCODONE HYDROCHLORIDE 10 MG: 10 TABLET ORAL at 18:03

## 2018-01-23 RX ADMIN — LORAZEPAM 1 MG: 2 INJECTION INTRAMUSCULAR; INTRAVENOUS at 02:39

## 2018-01-23 RX ADMIN — MORPHINE SULFATE 4 MG: 4 INJECTION INTRAVENOUS at 00:07

## 2018-01-23 RX ADMIN — ALBUTEROL SULFATE 2 PUFF: 90 AEROSOL, METERED RESPIRATORY (INHALATION) at 07:13

## 2018-01-23 RX ADMIN — ALBUTEROL SULFATE 2 PUFF: 90 AEROSOL, METERED RESPIRATORY (INHALATION) at 21:00

## 2018-01-23 RX ADMIN — MORPHINE SULFATE 4 MG: 4 INJECTION INTRAVENOUS at 16:07

## 2018-01-23 RX ADMIN — ENOXAPARIN SODIUM 40 MG: 100 INJECTION SUBCUTANEOUS at 09:11

## 2018-01-23 RX ADMIN — BUDESONIDE 0.5 MG: 0.5 SUSPENSION RESPIRATORY (INHALATION) at 21:28

## 2018-01-23 RX ADMIN — OXYCODONE HYDROCHLORIDE 10 MG: 10 TABLET ORAL at 13:09

## 2018-01-23 RX ADMIN — OXYCODONE HYDROCHLORIDE 10 MG: 10 TABLET ORAL at 09:08

## 2018-01-23 RX ADMIN — OXYCODONE HYDROCHLORIDE 10 MG: 10 TABLET ORAL at 22:45

## 2018-01-23 RX ADMIN — MORPHINE SULFATE 4 MG: 4 INJECTION INTRAVENOUS at 01:54

## 2018-01-23 ASSESSMENT — COPD QUESTIONNAIRES
DURING THE PAST 4 WEEKS HOW MUCH DID YOU FEEL SHORT OF BREATH: NONE/LITTLE OF THE TIME
HAVE YOU SMOKED AT LEAST 100 CIGARETTES IN YOUR ENTIRE LIFE: NO/DON'T KNOW
COPD SCREENING SCORE: 1
DO YOU EVER COUGH UP ANY MUCUS OR PHLEGM?: NO/ONLY WITH OCCASIONAL COLDS OR INFECTIONS

## 2018-01-23 ASSESSMENT — PAIN SCALES - GENERAL
PAINLEVEL_OUTOF10: 5
PAINLEVEL_OUTOF10: 8
PAINLEVEL_OUTOF10: 7
PAINLEVEL_OUTOF10: 8
PAINLEVEL_OUTOF10: 3
PAINLEVEL_OUTOF10: 7
PAINLEVEL_OUTOF10: 4
PAINLEVEL_OUTOF10: 8
PAINLEVEL_OUTOF10: 7
PAINLEVEL_OUTOF10: 5
PAINLEVEL_OUTOF10: 5

## 2018-01-23 NOTE — PROGRESS NOTES
Patient alert and oriented x 4. Incision site to lower abdomen. No drainage noted. Dressing clean and dry. Abdominal binder on. Family member at bedside. Patient complained of pain. Medicated with prn Morphine with positive effect. Ambulated on unit with family member.

## 2018-01-23 NOTE — PROGRESS NOTES
"Progress Note:    S: Doing well  Walked in [place]  Significant pain    O:              Current Facility-Administered Medications   Medication Dose   • albuterol inhaler 1-2 Puff  1-2 Puff   • lactated ringers infusion     • budesonide (PULMICORT) neb susp 0.5 mg  0.5 mg   • enoxaparin (LOVENOX) inj 40 mg  40 mg   • lactated ringers infusion     • ondansetron (ZOFRAN) syringe/vial injection 4 mg  4 mg   • diphenhydrAMINE (BENADRYL) injection 25 mg  25 mg   • diphenhydrAMINE (BENADRYL) tablet/capsule 25 mg  25 mg    Or   • diphenhydrAMINE (BENADRYL) injection 25 mg  25 mg   • benzocaine-menthol (CEPACOL) lozenge 1 Lozenge  1 Lozenge   • Respiratory Care per Protocol     • enalaprilat (VASOTEC) injection 2.5 mg  2.5 mg   • famotidine (PEPCID) tablet 20 mg  20 mg    Or   • famotidine (PEPCID) injection 20 mg  20 mg   • hydrALAZINE (APRESOLINE) injection 10 mg  10 mg   • lorazepam (ATIVAN) injection 0.5-1 mg  0.5-1 mg   • morphine (pf) 4 mg/ml injection 1-4 mg  1-4 mg   • promethazine (PHENERGAN) suppository 25 mg  25 mg   • oxycodone immediate-release (ROXICODONE) tablet 5 mg  5 mg    Or   • oxycodone immediate-release (ROXICODONE) tablet 10 mg  10 mg       PE:  Blood pressure 132/67, pulse 75, temperature 37.1 °C (98.7 °F), resp. rate 16, height 1.651 m (5' 5\"), weight 95.2 kg (209 lb 14.1 oz), SpO2 96 %, not currently breastfeeding.    Intake/Output Summary (Last 24 hours) at 01/23/18 1338  Last data filed at 01/23/18 0800   Gross per 24 hour   Intake             1240 ml   Output                0 ml   Net             1240 ml       Abd soft, wound dry    Rads:  No orders to display       A:   Active Hospital Problems    Diagnosis   • Recurrent ventral hernia [K43.2]         P: Continue in hospital 1 more day for pain control  Ambulate/IS    John Ganser M.D.  [facility]  "

## 2018-01-23 NOTE — CARE PLAN
Problem: Safety  Goal: Will remain free from falls  Outcome: PROGRESSING AS EXPECTED  Ambulate with standby assist. Bed in lowest position.     Problem: Pain Management  Goal: Pain level will decrease to patient's comfort goal  Outcome: PROGRESSING AS EXPECTED  Medicated with prn pain medication for pain.

## 2018-01-23 NOTE — PROGRESS NOTES
Pt arrived to T432 bed 1  No immediate distress.  A&Ox4  C/O pain, medicated per MAR  Denies n/v  Wearing Abdominal binder   Oriented to room, white board, and call light.  Call light and personal belongings within reach.  Fall precautions and hourly rounding in place  Family at bedside  Ambulated to restroom with standby assistance  +void  LBM this morning

## 2018-01-23 NOTE — PROGRESS NOTES
Assumed care at 0700. Received report from night shift RN. Bedside report completed. AOx4.      C/o increased pain today-medicated.  Denies nausea.  Tolerating diet. +voiding. LBM 1/22 prior to surgery.  Wearing abdominal binder at all times.    Ambulating with steady gait. Pt call light and belongings within reach, fall precautions in place. Family at bedside.

## 2018-01-24 VITALS
HEART RATE: 79 BPM | BODY MASS INDEX: 34.97 KG/M2 | HEIGHT: 65 IN | DIASTOLIC BLOOD PRESSURE: 59 MMHG | TEMPERATURE: 98.4 F | SYSTOLIC BLOOD PRESSURE: 102 MMHG | WEIGHT: 209.88 LBS | OXYGEN SATURATION: 94 % | RESPIRATION RATE: 16 BRPM

## 2018-01-24 PROCEDURE — 94760 N-INVAS EAR/PLS OXIMETRY 1: CPT

## 2018-01-24 PROCEDURE — 94640 AIRWAY INHALATION TREATMENT: CPT

## 2018-01-24 PROCEDURE — G0378 HOSPITAL OBSERVATION PER HR: HCPCS

## 2018-01-24 PROCEDURE — 96376 TX/PRO/DX INJ SAME DRUG ADON: CPT

## 2018-01-24 PROCEDURE — 700102 HCHG RX REV CODE 250 W/ 637 OVERRIDE(OP): Performed by: PHYSICIAN ASSISTANT

## 2018-01-24 PROCEDURE — 700102 HCHG RX REV CODE 250 W/ 637 OVERRIDE(OP): Performed by: SURGERY

## 2018-01-24 PROCEDURE — 700111 HCHG RX REV CODE 636 W/ 250 OVERRIDE (IP): Performed by: PHYSICIAN ASSISTANT

## 2018-01-24 PROCEDURE — A9270 NON-COVERED ITEM OR SERVICE: HCPCS | Performed by: SURGERY

## 2018-01-24 PROCEDURE — A9270 NON-COVERED ITEM OR SERVICE: HCPCS | Performed by: PHYSICIAN ASSISTANT

## 2018-01-24 PROCEDURE — 96375 TX/PRO/DX INJ NEW DRUG ADDON: CPT

## 2018-01-24 RX ORDER — KETOROLAC TROMETHAMINE 30 MG/ML
30 INJECTION, SOLUTION INTRAMUSCULAR; INTRAVENOUS EVERY 6 HOURS PRN
Status: DISCONTINUED | OUTPATIENT
Start: 2018-01-24 | End: 2018-01-24 | Stop reason: HOSPADM

## 2018-01-24 RX ORDER — OXYCODONE HYDROCHLORIDE 5 MG/1
5-10 TABLET ORAL EVERY 6 HOURS PRN
Qty: 40 TAB | Refills: 0 | Status: SHIPPED | OUTPATIENT
Start: 2018-01-24 | End: 2018-01-29

## 2018-01-24 RX ADMIN — FAMOTIDINE 20 MG: 20 TABLET, FILM COATED ORAL at 07:30

## 2018-01-24 RX ADMIN — OXYCODONE HYDROCHLORIDE 10 MG: 10 TABLET ORAL at 07:30

## 2018-01-24 RX ADMIN — BUDESONIDE 0.5 MG: 0.5 SUSPENSION RESPIRATORY (INHALATION) at 07:12

## 2018-01-24 RX ADMIN — KETOROLAC TROMETHAMINE 30 MG: 30 INJECTION, SOLUTION INTRAMUSCULAR at 16:46

## 2018-01-24 RX ADMIN — ALBUTEROL SULFATE 2 PUFF: 90 AEROSOL, METERED RESPIRATORY (INHALATION) at 07:11

## 2018-01-24 RX ADMIN — OXYCODONE HYDROCHLORIDE 10 MG: 10 TABLET ORAL at 11:47

## 2018-01-24 RX ADMIN — ENOXAPARIN SODIUM 40 MG: 100 INJECTION SUBCUTANEOUS at 07:30

## 2018-01-24 RX ADMIN — OXYCODONE HYDROCHLORIDE 10 MG: 10 TABLET ORAL at 15:39

## 2018-01-24 RX ADMIN — LORAZEPAM 1 MG: 2 INJECTION INTRAMUSCULAR; INTRAVENOUS at 00:29

## 2018-01-24 ASSESSMENT — PAIN SCALES - GENERAL
PAINLEVEL_OUTOF10: 6
PAINLEVEL_OUTOF10: 4
PAINLEVEL_OUTOF10: 5
PAINLEVEL_OUTOF10: 8
PAINLEVEL_OUTOF10: 7

## 2018-01-24 ASSESSMENT — ENCOUNTER SYMPTOMS
CHILLS: 0
SHORTNESS OF BREATH: 0
HEARTBURN: 0
VOMITING: 0
ABDOMINAL PAIN: 1
NAUSEA: 0
FEVER: 0

## 2018-01-24 NOTE — PROGRESS NOTES
Surgical Progress Note    Author: Conor Mariano Date & Time created: 2018   2:52 PM     Interval Events:  S/p Recurrent incisional hernia repair with mesh, 11 cm Ventrio ST -POD#2, doing well; pt reports subjective fatigue/lethargy, but no N/V; pain well controlled;ambulatory; wants to go home    Review of Systems   Constitutional: Positive for malaise/fatigue. Negative for chills and fever.   Respiratory: Negative for shortness of breath.    Cardiovascular: Negative for leg swelling.   Gastrointestinal: Positive for abdominal pain. Negative for heartburn, nausea and vomiting.   Skin: Positive for itching and rash.     Hemodynamics:  Temp (24hrs), Av.9 °C (98.4 °F), Min:36.7 °C (98 °F), Max:37.1 °C (98.7 °F)  Temperature: 36.9 °C (98.4 °F)  Pulse  Av.5  Min: 69  Max: 109  Blood Pressure: 102/59     Respiratory:    Respiration: 16, Pulse Oximetry: 94 %, O2 Daily Delivery Respiratory : Room Air with O2 Available     Given By:: Mouthpiece, #MDI/DPI Given: MDI/DPI x 1, Work Of Breathing / Effort: Mild  RUL Breath Sounds: Clear, RML Breath Sounds: Clear, RLL Breath Sounds: Diminished, JESSIKA Breath Sounds: Clear, LLL Breath Sounds: Diminished  Neuro:  GCS       Fluids:    Intake/Output Summary (Last 24 hours) at 18 1452  Last data filed at 18 1300   Gross per 24 hour   Intake              720 ml   Output                0 ml   Net              720 ml        Current Diet Order   Procedures   • DIET ORDER     Physical Exam   Constitutional: She is oriented to person, place, and time. No distress.   Cardiovascular: Regular rhythm.    Pulmonary/Chest: Effort normal. No respiratory distress.   Abdominal: Soft.   Dressing c/d/i   Neurological: She is oriented to person, place, and time.   Slightly lethargic   Skin: Skin is warm and dry.   Nursing note and vitals reviewed.    Labs:  No results found for this or any previous visit (from the past 24 hour(s)).  Medical Decision Making, by Problem:  Active  Hospital Problems    Diagnosis   • Recurrent ventral hernia [K43.2]     Plan:  Discharge home when alert, comfortable, ambulatory, and tolerating PO well.  Pt counseled re: diet, activity, home med's, and wound care.  Regular diet.  Sponge baths until 1/26/18. At that point, remove dressing and ok to shower.   No baths/soaks x 2 weeks.  No driving for 4-5 days.  No lifting >15 lbs for 3-4 weeks.  F/U with Dr. Ganser in 1-2 weeks.    Quality Measures:    Reviewed items::  Medications reviewed  Keen catheter::  No Keen  DVT prophylaxis pharmacological::  Enoxaparin (Lovenox)  DVT prophylaxis - mechanical:  SCDs  Ulcer Prophylaxis::  Yes      Discussed patient condition with Family, RN, Patient and Dr. Ganser

## 2018-01-24 NOTE — PROGRESS NOTES
Patient alert and oriented x 4. Surgical dressing clean and intact. Patient wearing abdominal binder at all times. Ambulated on unit with family member. Family member at bed side.

## 2018-01-24 NOTE — CARE PLAN
Problem: Pain Management  Goal: Pain level will decrease to patient's comfort goal    Intervention: Educate and implement non-pharmacologic comfort measures. Examples: relaxation, distration, play therapy, activity therapy, massage, etc.  Medicating for pain PRN per MAR.  Ice packs provided.      Problem: Respiratory:  Goal: Respiratory status will improve    Intervention: Educate and encourage coughing and deep breathing  Encouraging ambulation, turning, coughing, IS use, and deep breathing.

## 2018-01-24 NOTE — CARE PLAN
Problem: Safety  Goal: Will remain free from falls  Outcome: PROGRESSING AS EXPECTED  Bed in lowest position and call light within reach.    Problem: Knowledge Deficit  Goal: Knowledge of disease process/condition, treatment plan, diagnostic tests, and medications will improve  Outcome: PROGRESSING AS EXPECTED  Patient educated on medication.

## 2018-01-24 NOTE — PROGRESS NOTES
Discharging Patient home per physician order.  Discharged with Family.  Demonstrated understanding of discharge instructions, follow up appointments, home medications, prescriptions, home care for surgical wound and nursing care instructions.  Ambulating without assistance, voiding without difficulty, pain controlled with oral medication, tolerating oral medications, oxygen saturation greater than 90%, tolerating diet.  Educational handouts given and discussed.  Verbalized understanding of discharge instructions and educational handouts.  All questions answered.  Belongings with patient at time of discharge.

## 2018-01-24 NOTE — DISCHARGE INSTRUCTIONS
Discharge Instructions    Discharged to home by car with relative. Discharged via wheelchair, hospital escort: Yes.  Special equipment needed: Not Applicable    Be sure to schedule a follow-up appointment with your primary care doctor or any specialists as instructed.     Discharge Plan:   Smoking Cessation Offered: Patient Refused  Influenza Vaccine Indication: Patient Refuses    I understand that a diet low in cholesterol, fat, and sodium is recommended for good health. Unless I have been given specific instructions below for another diet, I accept this instruction as my diet prescription.   Other diet: Regular diet as tolerated      Special Instructions:   Monitor for signs and symptoms of infection (fever, chills, nausea, vomiting)  Monitor incisions for swelling, redness, or drainage                                                                                                                                      Ventral Hernia Repair, Care After  Refer to this sheet in the next few weeks. These instructions provide you with information on caring for yourself after your procedure. Your caregiver may also give you more specific instructions. Your treatment has been planned according to current medical practices, but problems sometimes occur. Call your caregiver if you have any problems or questions after your procedure.   HOME CARE INSTRUCTIONS   · Only take over-the-counter or prescription medicines as directed by your caregiver. If antibiotic medicines are prescribed, take them as directed. Finish them even if you start to feel better.  · Always wash your hands before touching your abdomen.  · Take your bandages (dressings) off after 48 hours or as directed by your caregiver. You may have skin adhesive strips or skin glue over the surgical cuts (incisions). Do not take the strips off or peel the glue off. These will fall off on their own.  · Take showers once your caregiver approves. Until then, only take  sponge baths. Do not take tub baths or go swimming until your caregiver approves.  · Check your incision area every day for swelling, redness, warmth, and blood or fluid leaking from the incision. These are signs of infection. Wash your hands before you check.  · Hold a pillow over your abdomen when you cough or sneeze to help ease pain.  · Eat foods that are high in fiber, such as whole grains, fruits, and vegetables. Fiber helps prevent difficult bowel movements (constipation).  · Drink enough fluids to keep your urine clear or pale yellow.  · Rest and lessen activity for 4-5 days after the surgery. You may take short walks if your caregiver approves. Do not drive until approved by your caregiver.  · Do not lift anything heavy, participate in sports, or have sexual intercourse for 6-8 weeks or until your caregiver approves.    · Ask your caregiver when you can return to work.  · Keep all follow-up appointments.  SEEK MEDICAL CARE IF:   · You have pain even after taking pain medicine.  · You have not had a bowel movement in 3 days.  · You have cramps or are nauseous.  SEEK IMMEDIATE MEDICAL CARE IF:   · You have pain or swelling that is getting worse.  · You have redness around your incisions.  · Your incision is pulling apart.  · You have blood or fluid leaking from your incisions.  · You are vomiting.  · You cannot pass urine.  MAKE SURE YOU:   · Understand these instructions.  · Will watch your condition.  · Will get help right away if you are not doing well or get worse.     This information is not intended to replace advice given to you by your health care provider. Make sure you discuss any questions you have with your health care provider.     Document Released: 12/04/2013 Document Revised: 01/08/2016 Document Reviewed: 12/04/2013  [employer] Interactive Patient Education ©2016 [employer]    Incision Care   An incision (cut) is when a surgeon cuts into your body. After surgery, the cut needs to be well  cared for to keep it from getting infected.   HOW TO CARE FOR YOUR CUT  · Take medicines only as told by your doctor.  · There are many different ways to close and cover a cut, including stitches, skin glue, and adhesive strips. Follow your doctor's instructions on:  ¨ Care of the cut.  ¨ Bandage (dressing) changes and removal.  ¨ Cut closure removal.  · Do not take baths, swim, or use a hot tub until your doctor says it is okay. You may shower as told by your doctor.  · Return to your normal diet and activities as allowed by your doctor.  · Use medicine that helps lessen itching on your cut as told by your doctor. Do not pick or scratch at your cut.  · Drink enough fluids to keep your pee (urine) clear or pale yellow.  GET HELP IF:  · You have redness, puffiness (swelling), or pain at the site of your cut.  · You have fluid, blood, or pus coming from your cut.  · Your muscles ache.  · You have chills or you feel sick.  · You have a bad smell coming from the cut or bandage.  · Your cut opens up after stitches, staples, or adhesive strips have been removed.  · You keep feeling sick to your stomach (nauseous) or keep throwing up (vomiting).  · You have a fever.  · You are dizzy.  GET HELP RIGHT AWAY IF:  · You have a rash.  · You pass out (faint).  · You have trouble breathing.  MAKE SURE YOU:   · Understand these instructions.  · Will watch your condition.  · Will get help right away if you are not doing well or get worse.     This information is not intended to replace advice given to you by your health care provider. Make sure you discuss any questions you have with your health care provider.     Document Released: 03/11/2013 Document Revised: 01/08/2016 Document Reviewed: 02/11/2015  [employer] Interactive Patient Education ©2016 [employer]    Ventral Hernia  A ventral hernia (also called an incisional hernia) is a hernia that occurs at the site of a previous surgical cut (incision) in the abdomen. The abdominal  wall spans from your lower chest down to your pelvis. If the abdominal wall is weakened from a surgical incision, a hernia can occur. A hernia is a bulge of bowel or muscle tissue pushing out on the weakened part of the abdominal wall. Ventral hernias can get bigger from straining or lifting.  Obese and older people are at higher risk for a ventral hernia. People who develop infections after surgery or require repeat incisions at the same site on the abdomen are also at increased risk.  CAUSES   A ventral hernia occurs because of weakness in the abdominal wall at an incision site.   SYMPTOMS   Common symptoms include:  · A visible bulge or lump on the abdominal wall.  · Pain or tenderness around the lump.  · Increased discomfort if you cough or make a sudden movement.  If the hernia has blocked part of the intestine, a serious complication can occur (incarcerated or strangulated hernia). This can become a problem that requires emergency surgery because the blood flow to the blocked intestine may be cut off. Symptoms may include:  · Feeling sick to your stomach (nauseous).  · Throwing up (vomiting).  · Stomach swelling (distention) or bloating.  · Fever.  · Rapid heartbeat.  DIAGNOSIS   Your health care provider will take a medical history and perform a physical exam. Various tests may be ordered, such as:  · Blood tests.  · Urine tests.  · Ultrasonography.  · X-rays.  · Computed tomography (CT).  TREATMENT   Watchful waiting may be all that is needed for a smaller hernia that does not cause symptoms. Your health care provider may recommend the use of a supportive belt (truss) that helps to keep the abdominal wall intact. For larger hernias or those that cause pain, surgery to repair the hernia is usually recommended. If a hernia becomes strangulated, emergency surgery needs to be done right away.  HOME CARE INSTRUCTIONS  · Avoid putting pressure or strain on the abdominal area.  · Avoid heavy lifting.  · Use good  body positioning for physical tasks. Ask your health care provider about proper body positioning.  · Use a supportive belt as directed by your health care provider.  · Maintain a healthy weight.  · Eat foods that are high in fiber, such as whole grains, fruits, and vegetables. Fiber helps prevent difficult bowel movements (constipation).  · Drink enough fluids to keep your urine clear or pale yellow.  · Follow up with your health care provider as directed.  SEEK MEDICAL CARE IF:   · Your hernia seems to be getting larger or more painful.  SEEK IMMEDIATE MEDICAL CARE IF:   · You have abdominal pain that is sudden and sharp.  · Your pain becomes severe.  · You have repeated vomiting.  · You are sweating a lot.  · You notice a rapid heartbeat.  · You develop a fever.  MAKE SURE YOU:   · Understand these instructions.  · Will watch your condition.  · Will get help right away if you are not doing well or get worse.     This information is not intended to replace advice given to you by your health care provider. Make sure you discuss any questions you have with your health care provider.     Document Released: 12/04/2013 Document Revised: 01/08/2016 Document Reviewed: 12/04/2013  [employer] Interactive Patient Education ©2016 [employer]    Depression / Suicide Risk    As you are discharged from this [facility] facility, it is important to learn how to keep safe from harming yourself.    Recognize the warning signs:  · Abrupt changes in personality, positive or negative- including increase in energy   · Giving away possessions  · Change in eating patterns- significant weight changes-  positive or negative  · Change in sleeping patterns- unable to sleep or sleeping all the time   · Unwillingness or inability to communicate  · Depression  · Unusual sadness, discouragement and loneliness  · Talk of wanting to die  · Neglect of personal appearance   · Rebelliousness- reckless behavior  · Withdrawal from people/activities  they love  · Confusion- inability to concentrate     If you or a loved one observes any of these behaviors or has concerns about self-harm, here's what you can do:  · Talk about it- your feelings and reasons for harming yourself  · Remove any means that you might use to hurt yourself (examples: pills, rope, extension cords, firearm)  · Get professional help from the community (Mental Health, Substance Abuse, psychological counseling)  · Do not be alone:Call your Safe Contact- someone whom you trust who will be there for you.  · Call your local CRISIS HOTLINE 203-7494 or 218-316-7957  · Call your local Children's Mobile Crisis Response Team [place] (435) 017-9066 or www.Ezuza  · Call the toll free National Suicide Prevention Hotlines   · National Suicide Prevention Lifeline 294-005-KFWJ (0352)  · National Hope Line Network 800-SUICIDE (899-9125)

## 2018-01-24 NOTE — PROGRESS NOTES
Assumed care at 0700. Received report from night shift RN. Bedside report completed. AOx4.       C/o pain-medicated.  Denies nausea.  Tolerating diet. +voiding. LBM 1/22 prior to surgery. +Flatus.  Wearing abdominal binder at all times.     Ambulating with steady gait. Pt call light and belongings within reach, fall precautions in place. Family at bedside

## 2018-02-03 NOTE — DISCHARGE SUMMARY
DATE OF ADMISSION:  01/22/2018    DATE OF DISCHARGE:  01/24/2018    DISCHARGE DIAGNOSIS:  Recurrent incisional hernia.    PROCEDURES:  01/22/2018, recurrent incisional hernia repair with mesh.    HOSPITAL COURSE:  The patient is a 58-year-old female, who has undergone a   prior incisional hernia repair in the mid abdomen.  She has developed   recurrence in the lower midline.  She underwent a successful repair with mesh.    Of note, she did have fairly extensive adhesions internally.  The new mesh   was placed on top of the old mesh to avoid those adhesions.  The fascia was   able to be closed over the mesh.  She tolerated the procedure well.  She began   ambulating by the first postoperative day, starting to tolerate liquids.  By   the second postoperative day, pain was controlled with oral medications.  She   is ambulating independently, and pain is well controlled.    CONDITION ON DISCHARGE:  Stable.    FOLLOWUP:  Follow up with Dr. Ganser in 1 week.    DISCHARGE MEDICATIONS:  Norco p.r.n. pain.  She will resume all of her usual   home medications as outlined in the discharge reconciliation and EPIC.       ____________________________________     JOHN H. GANSER, MD [facility] / [facility]    DD:  02/02/2018 14:40:21  DT:  02/02/2018 21:21:49    D#:  [id number]  Job#:  [id number]

## 2018-05-30 ENCOUNTER — HOSPITAL ENCOUNTER (EMERGENCY)
Dept: HOSPITAL 8 - ED | Age: 58
Discharge: HOME | End: 2018-05-30
Payer: MEDICAID

## 2018-05-30 VITALS — HEIGHT: 65 IN | BODY MASS INDEX: 37.36 KG/M2 | WEIGHT: 224.21 LBS

## 2018-05-30 VITALS — DIASTOLIC BLOOD PRESSURE: 85 MMHG | SYSTOLIC BLOOD PRESSURE: 142 MMHG

## 2018-05-30 DIAGNOSIS — Z90.49: ICD-10-CM

## 2018-05-30 DIAGNOSIS — G56.01: ICD-10-CM

## 2018-05-30 DIAGNOSIS — M19.011: Primary | ICD-10-CM

## 2018-05-30 PROCEDURE — 29260 STRAPPING OF ELBOW OR WRIST: CPT

## 2018-05-30 PROCEDURE — 73030 X-RAY EXAM OF SHOULDER: CPT

## 2018-05-30 PROCEDURE — 96372 THER/PROPH/DIAG INJ SC/IM: CPT

## 2018-05-30 PROCEDURE — 99284 EMERGENCY DEPT VISIT MOD MDM: CPT

## 2018-05-30 PROCEDURE — 72050 X-RAY EXAM NECK SPINE 4/5VWS: CPT

## 2021-03-15 DIAGNOSIS — Z23 NEED FOR VACCINATION: ICD-10-CM

## 2021-10-25 ENCOUNTER — OFFICE VISIT (OUTPATIENT)
Dept: MEDICAL GROUP | Facility: CLINIC | Age: 61
End: 2021-10-25
Payer: MEDICAID

## 2021-10-25 VITALS
TEMPERATURE: 98 F | HEART RATE: 92 BPM | BODY MASS INDEX: 39.77 KG/M2 | HEIGHT: 65 IN | OXYGEN SATURATION: 98 % | RESPIRATION RATE: 17 BRPM | DIASTOLIC BLOOD PRESSURE: 64 MMHG | SYSTOLIC BLOOD PRESSURE: 132 MMHG | WEIGHT: 238.7 LBS

## 2021-10-25 DIAGNOSIS — M25.551 HIP PAIN, RIGHT: ICD-10-CM

## 2021-10-25 DIAGNOSIS — G25.0 ESSENTIAL TREMOR: ICD-10-CM

## 2021-10-25 DIAGNOSIS — F41.8 MIXED ANXIETY DEPRESSIVE DISORDER: ICD-10-CM

## 2021-10-25 DIAGNOSIS — I83.813 VARICOSE VEINS OF BOTH LOWER EXTREMITIES WITH PAIN: ICD-10-CM

## 2021-10-25 DIAGNOSIS — S49.91XA INJURY OF RIGHT SHOULDER, INITIAL ENCOUNTER: ICD-10-CM

## 2021-10-25 DIAGNOSIS — F41.9 ANXIETY DISORDER, UNSPECIFIED TYPE: ICD-10-CM

## 2021-10-25 DIAGNOSIS — E05.90 HYPERTHYROIDISM: ICD-10-CM

## 2021-10-25 DIAGNOSIS — F32.A DEPRESSION, UNSPECIFIED DEPRESSION TYPE: ICD-10-CM

## 2021-10-25 DIAGNOSIS — Z12.31 SCREENING MAMMOGRAM FOR BREAST CANCER: ICD-10-CM

## 2021-10-25 PROBLEM — Z72.51 HIGH RISK SEXUAL BEHAVIOR: Status: ACTIVE | Noted: 2021-09-14

## 2021-10-25 PROBLEM — M19.90 ARTHRITIS: Status: ACTIVE | Noted: 2021-09-14

## 2021-10-25 PROBLEM — J45.909 ASTHMA: Status: ACTIVE | Noted: 2021-10-25

## 2021-10-25 PROBLEM — M54.6 THORACIC BACK PAIN: Status: ACTIVE | Noted: 2017-08-23

## 2021-10-25 PROBLEM — G89.29 NECK PAIN, CHRONIC: Status: ACTIVE | Noted: 2019-12-27

## 2021-10-25 PROBLEM — I82.409 DEEP VEIN THROMBOSIS (DVT) OF LOWER EXTREMITY (HCC): Status: ACTIVE | Noted: 2017-03-06

## 2021-10-25 PROBLEM — M75.100 ROTATOR CUFF TEAR: Status: ACTIVE | Noted: 2019-01-22

## 2021-10-25 PROBLEM — D64.9 ANEMIA: Status: ACTIVE | Noted: 2017-03-06

## 2021-10-25 PROBLEM — M54.2 NECK PAIN, CHRONIC: Status: ACTIVE | Noted: 2019-12-27

## 2021-10-25 PROBLEM — G43.909 MIGRAINE HEADACHE: Status: ACTIVE | Noted: 2021-03-11

## 2021-10-25 PROBLEM — K21.9 GASTROESOPHAGEAL REFLUX DISEASE: Status: ACTIVE | Noted: 2021-10-25

## 2021-10-25 PROBLEM — D51.0 VITAMIN B12 DEFICIENCY ANEMIA DUE TO INTRINSIC FACTOR DEFICIENCY: Status: ACTIVE | Noted: 2019-12-17

## 2021-10-25 PROBLEM — I83.90 VARICOSE VEINS OF LOWER EXTREMITY: Status: ACTIVE | Noted: 2019-01-09

## 2021-10-25 PROCEDURE — 99214 OFFICE O/P EST MOD 30 MIN: CPT | Performed by: FAMILY MEDICINE

## 2021-10-25 RX ORDER — RIZATRIPTAN BENZOATE 10 MG/1
TABLET, ORALLY DISINTEGRATING ORAL
COMMUNITY
Start: 2021-09-03 | End: 2022-02-01 | Stop reason: SDUPTHER

## 2021-10-25 RX ORDER — PREDNISONE 20 MG/1
TABLET ORAL
COMMUNITY
Start: 2021-08-08 | End: 2022-07-21

## 2021-10-25 RX ORDER — BECLOMETHASONE DIPROPIONATE HFA 40 UG/1
1 AEROSOL, METERED RESPIRATORY (INHALATION) 2 TIMES DAILY
COMMUNITY
Start: 2021-09-10

## 2021-10-25 RX ORDER — METHIMAZOLE 5 MG/1
5 TABLET ORAL DAILY
Qty: 90 TABLET | Refills: 3 | Status: SHIPPED | OUTPATIENT
Start: 2021-10-25 | End: 2023-01-19 | Stop reason: SDUPTHER

## 2021-10-25 RX ORDER — PROPRANOLOL HYDROCHLORIDE 40 MG/1
TABLET ORAL
COMMUNITY
Start: 2021-09-08 | End: 2022-01-03 | Stop reason: SDUPTHER

## 2021-10-25 ASSESSMENT — PATIENT HEALTH QUESTIONNAIRE - PHQ9: CLINICAL INTERPRETATION OF PHQ2 SCORE: 0

## 2021-10-25 NOTE — PROGRESS NOTES
CC:                                                                                                                                      HPI:   Luz presents today with the following.     Patient Active Problem List    Diagnosis Date Noted   • Asthma 10/25/2021   • Gastroesophageal reflux disease 10/25/2021   • Mixed anxiety depressive disorder 10/25/2021   • Right shoulder injury 10/25/2021   • Arthritis 09/14/2021   • High risk sexual behavior 09/14/2021   • Migraine headache 03/11/2021   • Subclinical hyperthyroidism 01/09/2020   • Neck pain, chronic 12/27/2019   • Essential tremor 12/17/2019   • Anxiety disorder, unspecified 12/17/2019   • Vitamin B12 deficiency anemia due to intrinsic factor deficiency 12/17/2019   • Rotator cuff tear 01/22/2019   • Varicose veins of lower extremity 01/09/2019   • Recurrent ventral hernia 01/22/2018   • Thoracic back pain 08/23/2017   • Anemia 03/06/2017   • Deep vein thrombosis (DVT) of lower extremity (HCC) 03/06/2017   • Low back pain 04/08/2016   • Insomnia 04/08/2016   • Hip pain, right 04/08/2016   • Chronic maxillary sinusitis 02/24/2016   • Hernia, incisional 02/24/2016   • Obesity 02/24/2016   • Goiter, toxic, multinodular 09/18/2014   • Hyperthyroidism 07/02/2014   • Status post gastric bypass for obesity 03/19/2014       Current Outpatient Medications   Medication Sig Dispense Refill   • methimazole (TAPAZOLE) 5 MG Tab Take 1 Tablet by mouth every day. 90 Tablet 3   • QVAR REDIHALER 40 MCG/ACT inhaler      • predniSONE (DELTASONE) 20 MG Tab      • propranolol (INDERAL) 40 MG Tab      • rizatriptan (MAXALT-MLT) 10 MG disintegrating tablet      • albuterol 108 (90 Base) MCG/ACT Aero Soln inhalation aerosol Inhale 1-2 Puffs by mouth 2 times a day as needed for Shortness of Breath.     • budesonide (PULMICORT) 0.5 MG/2ML Suspension 500 mcg by Nebulization route 2 times a day.     • sertraline (ZOLOFT) 100 MG Tab Take 100 mg by mouth every day.     • IRON PO Take  "325 mg by mouth every 12 hours.     • trazodone (DESYREL) 50 MG Tab Take 100 mg by mouth every day.       No current facility-administered medications for this visit.         Allergies as of 10/25/2021 - Reviewed 01/22/2018   Allergen Reaction Noted   • Hydrocodone-acetaminophen Hives 07/11/2008          /64 (BP Location: Right arm, Patient Position: Sitting, BP Cuff Size: Adult)   Pulse 92   Temp 36.7 °C (98 °F) (Temporal)   Resp 17   Ht 1.651 m (5' 5\")   Wt 108 kg (238 lb 11.2 oz)   SpO2 98%   BMI 39.72 kg/m²     Physical Exam:  Gen:         Alert and oriented, No apparent distress.  Neck:        No Lymphadenopathy or Bruits.  Lungs:     Clear to auscultation bilaterally  CV:          Regular rate and rhythm. No murmurs, rubs or gallops.               Ext:          No clubbing, cyanosis, edema, + varicose veins, no ulcers  R Shoulder: Full ROM, full strength, empty can test +, drop arm test -, Hawkin's -      Assessment and Plan.     Essential tremor  Likely due to her hyperthyroidism, she does have propranolol to take as needed    Hyperthyroidism   put her back on her Methimazole and I think we will go ahead and get her under the care of endocrine again    Mixed anxiety depressive disorder  She will consider on her current regimen of antidepressants and is requesting counseling    Right shoulder injury  I think we should start with an x-ray and some physical therapy, she has a history of a tear of her rotator cuff, and this seems to be consistent with her exam    Anxiety disorder, unspecified  above            "

## 2021-10-25 NOTE — ASSESSMENT & PLAN NOTE
I think we should start with an x-ray and some physical therapy, she has a history of a tear of her rotator cuff, and this seems to be consistent with her exam

## 2021-10-25 NOTE — ASSESSMENT & PLAN NOTE
put her back on her Methimazole and I think we will go ahead and get her under the care of endocrine again

## 2022-01-03 RX ORDER — SERTRALINE HYDROCHLORIDE 100 MG/1
100 TABLET, FILM COATED ORAL DAILY
Qty: 30 TABLET | Refills: 2 | Status: SHIPPED | OUTPATIENT
Start: 2022-01-03 | End: 2022-04-04

## 2022-01-03 RX ORDER — PROPRANOLOL HYDROCHLORIDE 40 MG/1
40 TABLET ORAL 2 TIMES DAILY
Qty: 60 TABLET | Refills: 2 | Status: SHIPPED | OUTPATIENT
Start: 2022-01-03 | End: 2022-04-05

## 2022-01-03 RX ORDER — PREDNISONE 20 MG/1
TABLET ORAL
Qty: 30 TABLET | OUTPATIENT
Start: 2022-01-03

## 2022-01-03 RX ORDER — OMEPRAZOLE 20 MG/1
20 CAPSULE, DELAYED RELEASE ORAL DAILY
Qty: 30 CAPSULE | Refills: 2 | Status: SHIPPED | OUTPATIENT
Start: 2022-01-03 | End: 2022-02-18 | Stop reason: SDUPTHER

## 2022-01-03 RX ORDER — OMEPRAZOLE 20 MG/1
20 CAPSULE, DELAYED RELEASE ORAL DAILY
COMMUNITY
End: 2022-01-03 | Stop reason: SDUPTHER

## 2022-01-03 NOTE — TELEPHONE ENCOUNTER
Luz called because she saw you on 10/25/21 but her refills did not go through. She is requesting them be resent in.    She also says you ordered a colonoscopy, but she never got the order. Can you order that? I don't see it in her chart.

## 2022-01-10 DIAGNOSIS — Z12.11 COLON CANCER SCREENING: ICD-10-CM

## 2022-02-01 RX ORDER — RIZATRIPTAN BENZOATE 10 MG/1
10 TABLET, ORALLY DISINTEGRATING ORAL
Qty: 10 TABLET | Refills: 0 | Status: SHIPPED | OUTPATIENT
Start: 2022-02-01 | End: 2023-01-05

## 2022-02-18 ENCOUNTER — OFFICE VISIT (OUTPATIENT)
Dept: MEDICAL GROUP | Facility: CLINIC | Age: 62
End: 2022-02-18
Payer: MEDICAID

## 2022-02-18 ENCOUNTER — APPOINTMENT (OUTPATIENT)
Dept: RADIOLOGY | Facility: CLINIC | Age: 62
End: 2022-02-18
Attending: STUDENT IN AN ORGANIZED HEALTH CARE EDUCATION/TRAINING PROGRAM
Payer: MEDICAID

## 2022-02-18 VITALS
RESPIRATION RATE: 18 BRPM | SYSTOLIC BLOOD PRESSURE: 100 MMHG | HEIGHT: 66 IN | BODY MASS INDEX: 38.09 KG/M2 | DIASTOLIC BLOOD PRESSURE: 68 MMHG | WEIGHT: 237 LBS | HEART RATE: 84 BPM

## 2022-02-18 DIAGNOSIS — M25.551 HIP PAIN, RIGHT: ICD-10-CM

## 2022-02-18 DIAGNOSIS — H15.89: ICD-10-CM

## 2022-02-18 PROCEDURE — 99214 OFFICE O/P EST MOD 30 MIN: CPT | Mod: GC | Performed by: STUDENT IN AN ORGANIZED HEALTH CARE EDUCATION/TRAINING PROGRAM

## 2022-02-18 RX ORDER — MELOXICAM 7.5 MG/1
7.5 TABLET ORAL DAILY
Qty: 60 TABLET | Refills: 1 | Status: SHIPPED | OUTPATIENT
Start: 2022-02-18 | End: 2022-09-07 | Stop reason: SDUPTHER

## 2022-02-18 RX ORDER — OMEPRAZOLE 20 MG/1
20 CAPSULE, DELAYED RELEASE ORAL DAILY
Qty: 90 CAPSULE | Refills: 2 | Status: SHIPPED | OUTPATIENT
Start: 2022-02-18 | End: 2023-02-24 | Stop reason: SDUPTHER

## 2022-02-18 RX ORDER — CYCLOBENZAPRINE HCL 10 MG
10 TABLET ORAL 3 TIMES DAILY PRN
Qty: 90 TABLET | Refills: 0 | Status: SHIPPED | OUTPATIENT
Start: 2022-02-18 | End: 2022-07-20 | Stop reason: SDUPTHER

## 2022-02-18 RX ORDER — TRAZODONE HYDROCHLORIDE 50 MG/1
100 TABLET ORAL NIGHTLY PRN
Qty: 30 TABLET | Refills: 2 | Status: SHIPPED | OUTPATIENT
Start: 2022-02-18 | End: 2022-09-08 | Stop reason: SDUPTHER

## 2022-02-18 NOTE — ASSESSMENT & PLAN NOTE
On exam, appears to be impingement within the joint itself, concern for osteoarthritis severe enough to cause destruction/impingement in the joint  Already has x-ray appointment next Tuesday, will send hip x-ray to be performed at the same time  Resend physical therapy referral  Meloxicam 7.5-15mg daily.  Caution with prior gastric bypass  Follow-up in about a month to determine efficacy of physical therapy as well as review imaging findings

## 2022-02-18 NOTE — PROGRESS NOTES
"HPI  Luz Ahumada is a 62 y.o. female presenting for evaluation of R eye problem and R hip pain.    Problem   Injection of Surface of Right Eye    With a history of chronic left-sided migraines well treated with propranolol and rizatriptan, has now had 2 episodes of severe right-sided headache that feels worse and different in nature from her migraines.  After her headaches now twice she is noticed redness in her right eye that looks like blood.  Denies changes in vision, and headaches resolve with 1000 mg of p.o. Tylenol.  The first episode does not remember when this was, but presented to urgent care who referred her to an eye doctor who told her it was not an eye problem.      After first episode, eye redness symptoms resolved over a couple of days, did not recur until recent severe right-sided headache a couple of days ago.     Hip Pain, Right    Right-sided hip pain has been chronic and progressive for several years; however, has been very severe for the past 2 weeks.  Previously evaluated by Dr. Sanchez and referred to PT, but she was never called by the PT office to schedule and has not followed up with them.  Has been worsening to the point where now she has difficulty walking as well as difficulty ascending stairs.  She works on her feet and having significant difficulty at work.              PMH   Past Medical History:   Diagnosis Date   • Anemia    • Anesthesia     \"woke up during surgery\"   • Arthritis    • Asthma    • Blood clotting disorder (HCC)     L leg   • Bronchitis 2016   • Heart burn    • Pain     joint pain   • Psychiatric problem     depression/anxiety   • Urinary bladder disorder    • Urinary incontinence        Past Surgical History:   Procedure Laterality Date   • VENTRAL HERNIA REPAIR  1/22/2018    Procedure: VENTRAL HERNIA REPAIR RECURRENT INCISIONAL;  Surgeon: John H Ganser, M.D.;  Location: SURGERY [facility];  Service: General   • GYN SURGERY  2016    hysterectomy   • VENTRAL " "HERNIA REPAIR LAPAROSCOPIC  10/29/08    Performed by PORSHA PAREDES at SURGERY [facility]S   • BOWEL RESECTION LAPAROSCOPIC  7/12/08    Performed by PORSHA PAREDES at SURGERY [facility] ORS   • GASTRIC BYPASS LAPAROSCOPIC  7/2/08   • CHOLECYSTECTOMY     • HERNIA REPAIR      X2, PROLAPSED VAG. TUMOR REMOVAL?       Social History     Tobacco Use   • Smoking status: Current Some Day Smoker     Packs/day: 0.50     Years: 10.00     Pack years: 5.00     Types: Cigarettes   • Smokeless tobacco: Never Used   Substance Use Topics   • Alcohol use: Yes     Comment: 8 q month       History reviewed. No pertinent family history.      PE  /68   Pulse 84   Resp 18   Ht 1.676 m (5' 6\")   Wt 108 kg (237 lb)   BMI 38.25 kg/m²     Physical Exam  Constitutional:       Appearance: Normal appearance.   HENT:      Head: Normocephalic and atraumatic.      Nose: Nose normal.      Mouth/Throat:      Mouth: Mucous membranes are moist.   Eyes:      General:         Right eye: No discharge.         Left eye: No discharge.      Extraocular Movements: Extraocular movements intact.      Comments: R eye with ruptured vessel and subconjunctival hemorrhage.  Basic retinal exam with wall ophthalmoscope without notable abnormality, EOM FROM, vision intact.   Neck:      Comments: Palpable muscle spasm in R trap/neck extensor musculature.  Pulmonary:      Effort: Pulmonary effort is normal. No respiratory distress.   Musculoskeletal:         General: Normal range of motion.      Cervical back: Normal range of motion and neck supple.      Comments: Significant limp favoring R hip, difficulty stepping onto exam table step.  Straight leg raise negative b/l, L hip FROM with KATHY/FADIR without pain.  R hip KATHY with severe pain and limited ROM, FADIR with milder pain and still limited ROM.   Skin:     General: Skin is warm and dry.   Neurological:      General: No focal deficit present.      Mental Status: She is alert and oriented to person, " place, and time. Mental status is at baseline.   Psychiatric:         Mood and Affect: Mood normal.         Behavior: Behavior normal.          A/P:  Hip pain, right  On exam, appears to be impingement within the joint itself, concern for osteoarthritis severe enough to cause destruction/impingement in the joint  Already has x-ray appointment next Tuesday, will send hip x-ray to be performed at the same time  Resend physical therapy referral  Meloxicam 7.5-15mg daily.  Caution with prior gastric bypass  Follow-up in about a month to determine efficacy of physical therapy as well as review imaging findings    Injection of surface of right eye  Evaluation of eye on exam with ruptured blood vessel, otherwise no generalized injection, no surrounding swelling, nontender, vision intact, and basic retinal exam without obvious abnormalities.  Reassurance provided that she is not suffering from aneurysm, brain bleed, or other concerns that she had.  Tension in extensor muscles of right neck, cyclobenzaprine trial advised, as this could be source of headache  Advise close monitoring and follow-up with optometry/us if it recurs

## 2022-02-18 NOTE — ASSESSMENT & PLAN NOTE
Evaluation of eye on exam with ruptured blood vessel, otherwise no generalized injection, no surrounding swelling, nontender, vision intact, and basic retinal exam without obvious abnormalities.  Reassurance provided that she is not suffering from aneurysm, brain bleed, or other concerns that she had.  Tension in extensor muscles of right neck, cyclobenzaprine trial advised, as this could be source of headache  Advise close monitoring and follow-up with optometry/us if it recurs

## 2022-04-04 RX ORDER — SERTRALINE HYDROCHLORIDE 100 MG/1
TABLET, FILM COATED ORAL
Qty: 30 TABLET | Refills: 2 | Status: SHIPPED | OUTPATIENT
Start: 2022-04-04 | End: 2022-10-10

## 2022-04-05 RX ORDER — PROPRANOLOL HYDROCHLORIDE 40 MG/1
TABLET ORAL
Qty: 60 TABLET | Refills: 2 | Status: SHIPPED | OUTPATIENT
Start: 2022-04-05 | End: 2022-10-10

## 2022-07-20 RX ORDER — CYCLOBENZAPRINE HCL 10 MG
10 TABLET ORAL 3 TIMES DAILY PRN
Qty: 90 TABLET | Refills: 3 | Status: SHIPPED | OUTPATIENT
Start: 2022-07-20

## 2022-07-21 ENCOUNTER — APPOINTMENT (OUTPATIENT)
Dept: RADIOLOGY | Facility: CLINIC | Age: 62
End: 2022-07-21
Attending: STUDENT IN AN ORGANIZED HEALTH CARE EDUCATION/TRAINING PROGRAM
Payer: MEDICAID

## 2022-07-21 ENCOUNTER — OFFICE VISIT (OUTPATIENT)
Dept: MEDICAL GROUP | Facility: CLINIC | Age: 62
End: 2022-07-21
Payer: MEDICAID

## 2022-07-21 VITALS
HEIGHT: 65 IN | BODY MASS INDEX: 38.99 KG/M2 | DIASTOLIC BLOOD PRESSURE: 88 MMHG | OXYGEN SATURATION: 96 % | HEART RATE: 50 BPM | WEIGHT: 234 LBS | RESPIRATION RATE: 12 BRPM | SYSTOLIC BLOOD PRESSURE: 156 MMHG

## 2022-07-21 DIAGNOSIS — G89.29 CHRONIC MIDLINE LOW BACK PAIN WITHOUT SCIATICA: ICD-10-CM

## 2022-07-21 DIAGNOSIS — M54.50 CHRONIC MIDLINE LOW BACK PAIN WITHOUT SCIATICA: ICD-10-CM

## 2022-07-21 PROCEDURE — 99213 OFFICE O/P EST LOW 20 MIN: CPT | Mod: GE | Performed by: STUDENT IN AN ORGANIZED HEALTH CARE EDUCATION/TRAINING PROGRAM

## 2022-07-21 PROCEDURE — 72100 X-RAY EXAM L-S SPINE 2/3 VWS: CPT | Mod: TC | Performed by: FAMILY MEDICINE

## 2022-07-21 NOTE — ASSESSMENT & PLAN NOTE
Acute on chronic.  Patient has limited mobility with forward flexion, backward flexion, and trunk rotation.  There is tenderness in the paraspinal area in the lumbar region.  There are no red flag signs suggestive of cauda equina or epidural abscess.  Lumbar x-ray in house today not concerning on my read.  Waiting on radiologist read.  We will send for physical therapy and refer to [facility] [place] per the patient's request.  In the meantime, recommended conservative treatment with continued NSAIDs, heat packs, and increased stretching.

## 2022-07-27 ENCOUNTER — PATIENT MESSAGE (OUTPATIENT)
Dept: MEDICAL GROUP | Facility: CLINIC | Age: 62
End: 2022-07-27
Payer: MEDICAID

## 2022-07-27 DIAGNOSIS — Z13.79 GENETIC SCREENING: ICD-10-CM

## 2022-07-27 DIAGNOSIS — E05.90 HYPERTHYROIDISM: ICD-10-CM

## 2022-07-27 DIAGNOSIS — M54.2 NECK PAIN, CHRONIC: ICD-10-CM

## 2022-07-27 DIAGNOSIS — G89.29 NECK PAIN, CHRONIC: ICD-10-CM

## 2022-08-01 ENCOUNTER — RESEARCH ENCOUNTER (OUTPATIENT)
Dept: MEDICAL GROUP | Facility: PHYSICIAN GROUP | Age: 62
End: 2022-08-01
Payer: MEDICAID

## 2022-08-01 DIAGNOSIS — Z00.6 RESEARCH STUDY PATIENT: ICD-10-CM

## 2022-08-02 ENCOUNTER — APPOINTMENT (OUTPATIENT)
Dept: MEDICAL GROUP | Facility: CLINIC | Age: 62
End: 2022-08-02
Payer: MEDICAID

## 2022-08-19 ENCOUNTER — TELEPHONE (OUTPATIENT)
Dept: MEDICAL GROUP | Facility: CLINIC | Age: 62
End: 2022-08-19
Payer: MEDICAID

## 2022-08-19 DIAGNOSIS — G89.29 NECK PAIN, CHRONIC: ICD-10-CM

## 2022-08-19 DIAGNOSIS — M54.2 NECK PAIN, CHRONIC: ICD-10-CM

## 2022-08-19 DIAGNOSIS — M54.50 CHRONIC MIDLINE LOW BACK PAIN WITHOUT SCIATICA: ICD-10-CM

## 2022-08-19 DIAGNOSIS — G89.29 CHRONIC MIDLINE LOW BACK PAIN WITHOUT SCIATICA: ICD-10-CM

## 2022-08-19 NOTE — TELEPHONE ENCOUNTER
Hi, pain management called and would like lower back pain to be added to the referral with the neck pain so they can properly treat the patient. Thank you! I can fax it over after.    Fax: [phone]

## 2022-08-22 LAB
APOB+LDLR+PCSK9 GENE MUT ANL BLD/T: NOT DETECTED
BRCA1+BRCA2 DEL+DUP + FULL MUT ANL BLD/T: NOT DETECTED
MLH1+MSH2+MSH6+PMS2 GN DEL+DUP+FUL M: NOT DETECTED

## 2022-09-07 RX ORDER — MELOXICAM 7.5 MG/1
7.5 TABLET ORAL DAILY
Qty: 90 TABLET | Refills: 3 | Status: SHIPPED | OUTPATIENT
Start: 2022-09-07 | End: 2024-01-18

## 2022-09-08 RX ORDER — TRAZODONE HYDROCHLORIDE 50 MG/1
100 TABLET ORAL NIGHTLY PRN
Qty: 30 TABLET | Refills: 2 | Status: SHIPPED | OUTPATIENT
Start: 2022-09-08 | End: 2022-11-03

## 2022-09-25 ENCOUNTER — PATIENT MESSAGE (OUTPATIENT)
Dept: MEDICAL GROUP | Facility: CLINIC | Age: 62
End: 2022-09-25
Payer: MEDICAID

## 2022-09-26 DIAGNOSIS — R32 URINARY INCONTINENCE, UNSPECIFIED TYPE: ICD-10-CM

## 2022-09-30 ENCOUNTER — PATIENT MESSAGE (OUTPATIENT)
Dept: MEDICAL GROUP | Facility: CLINIC | Age: 62
End: 2022-09-30
Payer: MEDICAID

## 2022-09-30 DIAGNOSIS — N39.46 MIXED STRESS AND URGE URINARY INCONTINENCE: ICD-10-CM

## 2022-10-07 DIAGNOSIS — R32 URINARY INCONTINENCE, UNSPECIFIED TYPE: ICD-10-CM

## 2022-10-10 RX ORDER — PROPRANOLOL HYDROCHLORIDE 40 MG/1
TABLET ORAL
Qty: 60 TABLET | Refills: 2 | Status: SHIPPED | OUTPATIENT
Start: 2022-10-10 | End: 2023-01-19 | Stop reason: SDUPTHER

## 2022-10-10 RX ORDER — SERTRALINE HYDROCHLORIDE 100 MG/1
TABLET, FILM COATED ORAL
Qty: 30 TABLET | Refills: 2 | Status: SHIPPED | OUTPATIENT
Start: 2022-10-10 | End: 2023-03-24

## 2022-10-21 ENCOUNTER — RESEARCH ENCOUNTER (OUTPATIENT)
Dept: RESEARCH | Facility: WORKSITE | Age: 62
End: 2022-10-21

## 2022-10-21 ENCOUNTER — OFFICE VISIT (OUTPATIENT)
Dept: MEDICAL GROUP | Facility: CLINIC | Age: 62
End: 2022-10-21
Payer: MEDICAID

## 2022-10-21 VITALS
WEIGHT: 239.7 LBS | SYSTOLIC BLOOD PRESSURE: 112 MMHG | OXYGEN SATURATION: 94 % | HEART RATE: 68 BPM | HEIGHT: 65 IN | BODY MASS INDEX: 39.94 KG/M2 | DIASTOLIC BLOOD PRESSURE: 72 MMHG

## 2022-10-21 DIAGNOSIS — Z13.79 GENETIC SCREENING: ICD-10-CM

## 2022-10-21 DIAGNOSIS — I10 PRIMARY HYPERTENSION: ICD-10-CM

## 2022-10-21 DIAGNOSIS — D50.8 OTHER IRON DEFICIENCY ANEMIA: ICD-10-CM

## 2022-10-21 DIAGNOSIS — E66.09 CLASS 2 OBESITY DUE TO EXCESS CALORIES WITHOUT SERIOUS COMORBIDITY WITH BODY MASS INDEX (BMI) OF 39.0 TO 39.9 IN ADULT: ICD-10-CM

## 2022-10-21 DIAGNOSIS — E05.90 SUBCLINICAL HYPERTHYROIDISM: ICD-10-CM

## 2022-10-21 DIAGNOSIS — Z12.31 SCREENING MAMMOGRAM FOR BREAST CANCER: ICD-10-CM

## 2022-10-21 DIAGNOSIS — M54.41 CHRONIC MIDLINE LOW BACK PAIN WITH BILATERAL SCIATICA: ICD-10-CM

## 2022-10-21 DIAGNOSIS — Z12.11 COLON CANCER SCREENING: ICD-10-CM

## 2022-10-21 DIAGNOSIS — D51.0 VITAMIN B12 DEFICIENCY ANEMIA DUE TO INTRINSIC FACTOR DEFICIENCY: ICD-10-CM

## 2022-10-21 DIAGNOSIS — G25.0 ESSENTIAL TREMOR: ICD-10-CM

## 2022-10-21 DIAGNOSIS — Z98.84 STATUS POST GASTRIC BYPASS FOR OBESITY: ICD-10-CM

## 2022-10-21 DIAGNOSIS — M54.42 CHRONIC MIDLINE LOW BACK PAIN WITH BILATERAL SCIATICA: ICD-10-CM

## 2022-10-21 DIAGNOSIS — G89.29 CHRONIC MIDLINE LOW BACK PAIN WITH BILATERAL SCIATICA: ICD-10-CM

## 2022-10-21 DIAGNOSIS — Z23 NEED FOR VACCINATION: ICD-10-CM

## 2022-10-21 DIAGNOSIS — Z00.6 RESEARCH STUDY PATIENT: ICD-10-CM

## 2022-10-21 PROBLEM — E53.8 COBALAMIN DEFICIENCY: Status: ACTIVE | Noted: 2021-12-14

## 2022-10-21 PROBLEM — I83.10 VARICOSE VEINS OF LOWER EXTREMITY WITH INFLAMMATION: Status: ACTIVE | Noted: 2019-01-09

## 2022-10-21 PROCEDURE — 90746 HEPB VACCINE 3 DOSE ADULT IM: CPT | Performed by: FAMILY MEDICINE

## 2022-10-21 PROCEDURE — 90471 IMMUNIZATION ADMIN: CPT | Performed by: FAMILY MEDICINE

## 2022-10-21 PROCEDURE — 90686 IIV4 VACC NO PRSV 0.5 ML IM: CPT | Performed by: FAMILY MEDICINE

## 2022-10-21 PROCEDURE — 90472 IMMUNIZATION ADMIN EACH ADD: CPT | Performed by: FAMILY MEDICINE

## 2022-10-21 PROCEDURE — 99214 OFFICE O/P EST MOD 30 MIN: CPT | Mod: 25 | Performed by: FAMILY MEDICINE

## 2022-10-21 RX ORDER — LISINOPRIL 10 MG/1
10 TABLET ORAL DAILY
Qty: 90 TABLET | Refills: 3 | Status: SHIPPED | OUTPATIENT
Start: 2022-10-21 | End: 2023-01-19 | Stop reason: SDUPTHER

## 2022-10-21 RX ORDER — GABAPENTIN 300 MG/1
300 CAPSULE ORAL EVERY 8 HOURS
COMMUNITY
Start: 2022-09-17

## 2022-10-21 RX ORDER — CYANOCOBALAMIN 1000 UG/ML
1000 INJECTION, SOLUTION INTRAMUSCULAR; SUBCUTANEOUS ONCE
Status: COMPLETED | OUTPATIENT
Start: 2022-10-21 | End: 2022-10-21

## 2022-10-21 RX ADMIN — CYANOCOBALAMIN 1000 MCG: 1000 INJECTION, SOLUTION INTRAMUSCULAR; SUBCUTANEOUS at 15:10

## 2022-10-21 ASSESSMENT — PATIENT HEALTH QUESTIONNAIRE - PHQ9: CLINICAL INTERPRETATION OF PHQ2 SCORE: 0

## 2022-10-21 NOTE — PROGRESS NOTES
Subjective:   CC:  f/u HTN    HPI:   Was high at dentist and spine doctor - sometimes 160s/, concern re blood pressures running too high for procedures    F/u lumbar radiculopathy  MRI 10/19/22  L5 chronic pars defects with grade 1 anterolisthesis of L5 on S1.  Severe L5-S1 degenerative disc changes.  Severe L5-S1 neuroforaminal narrowing bilaterally.   Lower lumbar spine facet arthropathy.  Mild mass effect and displacement of left L5 nerve root at the L4-5 level due to hypertrophic ligamentum flavum.   No central spinal canal stenosis  MRI C-spine  Congenitally narrow cervical spinal canal.  Degenerative disc changes detailed above.  No cord compression or abnormal cord signal.   Mild right C4-5 and moderate right C5-6 neural foraminal narrowing          Problem   Primary Hypertension   Cobalamin Deficiency   Asthma   Injury of Right Shoulder   Migraine   Varicose Veins of Lower Extremity With Inflammation   Thoracic Back Pain   Anemia   Deep Venous Thrombosis (Hcc)   Obesity   Hyperthyroidism       Current Outpatient Medications Ordered in Epic   Medication Sig Dispense Refill    gabapentin (NEURONTIN) 300 MG Cap Take 300 mg by mouth every 8 hours.      lisinopril (PRINIVIL) 10 MG Tab Take 1 Tablet by mouth every day. 90 Tablet 3    propranolol (INDERAL) 40 MG Tab TAKE 1 TABLET BY MOUTH TWICE A DAY (Patient taking differently: Take 40 mg by mouth 1 time a day as needed.) 60 Tablet 2    sertraline (ZOLOFT) 100 MG Tab TAKE 1 TABLET BY MOUTH EVERY DAY 30 Tablet 2    traZODone (DESYREL) 50 MG Tab Take 2 Tablets by mouth at bedtime as needed for Sleep. 30 Tablet 2    meloxicam (MOBIC) 7.5 MG Tab Take 1 Tablet by mouth every day. 90 Tablet 3    cyclobenzaprine (FLEXERIL) 10 mg Tab Take 1 Tablet by mouth 3 times a day as needed for Moderate Pain. 90 Tablet 3    omeprazole (PRILOSEC) 20 MG delayed-release capsule Take 1 Capsule by mouth every day. 90 Capsule 2    rizatriptan (MAXALT-MLT) 10 MG disintegrating tablet  "Take 1 Tablet by mouth one time as needed for Migraine for up to 1 dose. 10 Tablet 0    QVAR REDIHALER 40 MCG/ACT inhaler 1 Puff 2 times a day.      methimazole (TAPAZOLE) 5 MG Tab Take 1 Tablet by mouth every day. 90 Tablet 3    albuterol 108 (90 Base) MCG/ACT Aero Soln inhalation aerosol Inhale 1-2 Puffs by mouth 2 times a day as needed for Shortness of Breath.      budesonide (PULMICORT) 0.5 MG/2ML Suspension 500 mcg by Nebulization route 2 times a day.       No current [facility]-ordered facility-administered medications on file.         ROS:  Gen: no fevers/chills  Pulm: no sob, no cough  CV: no chest pain, no palpitations  GI: no nausea/vomiting, no diarrhea        Objective:     Exam:  /72 (BP Location: Right arm, Patient Position: Sitting, BP Cuff Size: Large adult)   Pulse 68   Ht 1.651 m (5' 5\")   Wt 109 kg (239 lb 11.2 oz)   SpO2 94%   BMI 39.89 kg/m²  Body mass index is 39.89 kg/m².    Gen: Alert and oriented, No apparent distress.  Neck: Neck is supple without lymphadenopathy.  Lungs: Normal effort, CTA bilaterally, no wheezes, rhonchi, or rales  CV: Regular rate and rhythm. No murmurs, rubs, or gallops.  Ext: No edema.      Assessment & Plan:     62 y.o. female with the following -     Problem List Items Addressed This Visit       Anemia    Relevant Orders    VITAMIN B12    CBC WITH DIFFERENTIAL    Low back pain    Essential tremor    Subclinical hyperthyroidism    Relevant Orders    TSH WITH REFLEX TO FT4    Obesity     The patient was counseled on the importance of eating a low carbohydrate diet and avoiding sugary beverages.  Recommendations were made for a healthy lifestyle with increasing physical activity.         Status post gastric bypass for obesity    Relevant Orders    VITAMIN D,25 HYDROXY (DEFICIENCY)    VITAMIN B12    Vitamin B12 deficiency anemia due to intrinsic factor deficiency    Relevant Orders    VITAMIN B12    Primary hypertension     Home BP logs and lisinopril 10mg, labs " ordered on chart         Relevant Medications    lisinopril (PRINIVIL) 10 MG Tab    Other Relevant Orders    Comp Metabolic Panel    Lipid Profile    HEMOGLOBIN A1C     Other Visit Diagnoses       Colon cancer screening        Relevant Orders    Referral to GI for Colonoscopy    Screening mammogram for breast cancer        Relevant Orders    MA-SCREENING MAMMO BILAT W/CAD    Genetic screening        Relevant Orders    Referral to Genetic Research Studies    Need for vaccination        Relevant Orders    Hepatitis B Vaccine Adult 20+ (Completed)    INFLUENZA VACCINE QUAD INJ (PF) (Completed)                  No follow-ups on file.             Class II - visualization of the soft palate, fauces, and uvula

## 2022-10-22 NOTE — ASSESSMENT & PLAN NOTE
The patient was counseled on the importance of eating a low carbohydrate diet and avoiding sugary beverages.  Recommendations were made for a healthy lifestyle with increasing physical activity.

## 2022-11-03 RX ORDER — TRAZODONE HYDROCHLORIDE 50 MG/1
100 TABLET ORAL NIGHTLY PRN
Qty: 30 TABLET | Refills: 2 | Status: SHIPPED | OUTPATIENT
Start: 2022-11-03 | End: 2023-05-18

## 2022-12-20 ENCOUNTER — HOSPITAL ENCOUNTER (EMERGENCY)
Facility: MEDICAL CENTER | Age: 62
End: 2022-12-20
Payer: MEDICAID

## 2022-12-20 VITALS
BODY MASS INDEX: 40.33 KG/M2 | HEART RATE: 81 BPM | DIASTOLIC BLOOD PRESSURE: 75 MMHG | HEIGHT: 65 IN | RESPIRATION RATE: 18 BRPM | TEMPERATURE: 99 F | SYSTOLIC BLOOD PRESSURE: 115 MMHG | OXYGEN SATURATION: 94 % | WEIGHT: 242.06 LBS

## 2022-12-20 LAB
ALBUMIN SERPL BCP-MCNC: 4.1 G/DL (ref 3.2–4.9)
ALBUMIN/GLOB SERPL: 1.4 G/DL
ALP SERPL-CCNC: 153 U/L (ref 30–99)
ALT SERPL-CCNC: 11 U/L (ref 2–50)
ANION GAP SERPL CALC-SCNC: 15 MMOL/L (ref 7–16)
AST SERPL-CCNC: 20 U/L (ref 12–45)
BASOPHILS # BLD AUTO: 0.7 % (ref 0–1.8)
BASOPHILS # BLD: 0.05 K/UL (ref 0–0.12)
BILIRUB SERPL-MCNC: 0.2 MG/DL (ref 0.1–1.5)
BUN SERPL-MCNC: 9 MG/DL (ref 8–22)
CALCIUM ALBUM COR SERPL-MCNC: 8.6 MG/DL (ref 8.5–10.5)
CALCIUM SERPL-MCNC: 8.7 MG/DL (ref 8.5–10.5)
CHLORIDE SERPL-SCNC: 103 MMOL/L (ref 96–112)
CO2 SERPL-SCNC: 19 MMOL/L (ref 20–33)
CREAT SERPL-MCNC: 1.12 MG/DL (ref 0.5–1.4)
EKG IMPRESSION: NORMAL
EOSINOPHIL # BLD AUTO: 0.27 K/UL (ref 0–0.51)
EOSINOPHIL NFR BLD: 4 % (ref 0–6.9)
ERYTHROCYTE [DISTWIDTH] IN BLOOD BY AUTOMATED COUNT: 46.9 FL (ref 35.9–50)
GFR SERPLBLD CREATININE-BSD FMLA CKD-EPI: 56 ML/MIN/1.73 M 2
GLOBULIN SER CALC-MCNC: 2.9 G/DL (ref 1.9–3.5)
GLUCOSE SERPL-MCNC: 87 MG/DL (ref 65–99)
HCT VFR BLD AUTO: 39.7 % (ref 37–47)
HGB BLD-MCNC: 12.1 G/DL (ref 12–16)
IMM GRANULOCYTES # BLD AUTO: 0.02 K/UL (ref 0–0.11)
IMM GRANULOCYTES NFR BLD AUTO: 0.3 % (ref 0–0.9)
LYMPHOCYTES # BLD AUTO: 2.03 K/UL (ref 1–4.8)
LYMPHOCYTES NFR BLD: 29.9 % (ref 22–41)
MCH RBC QN AUTO: 25.2 PG (ref 27–33)
MCHC RBC AUTO-ENTMCNC: 30.5 G/DL (ref 33.6–35)
MCV RBC AUTO: 82.7 FL (ref 81.4–97.8)
MONOCYTES # BLD AUTO: 0.53 K/UL (ref 0–0.85)
MONOCYTES NFR BLD AUTO: 7.8 % (ref 0–13.4)
NEUTROPHILS # BLD AUTO: 3.9 K/UL (ref 2–7.15)
NEUTROPHILS NFR BLD: 57.3 % (ref 44–72)
NRBC # BLD AUTO: 0 K/UL
NRBC BLD-RTO: 0 /100 WBC
PLATELET # BLD AUTO: 282 K/UL (ref 164–446)
PMV BLD AUTO: 11.1 FL (ref 9–12.9)
POTASSIUM SERPL-SCNC: 3.7 MMOL/L (ref 3.6–5.5)
PROT SERPL-MCNC: 7 G/DL (ref 6–8.2)
RBC # BLD AUTO: 4.8 M/UL (ref 4.2–5.4)
SODIUM SERPL-SCNC: 137 MMOL/L (ref 135–145)
TROPONIN T SERPL-MCNC: 6 NG/L (ref 6–19)
WBC # BLD AUTO: 6.8 K/UL (ref 4.8–10.8)

## 2022-12-20 PROCEDURE — 93005 ELECTROCARDIOGRAM TRACING: CPT

## 2022-12-20 PROCEDURE — 85025 COMPLETE CBC W/AUTO DIFF WBC: CPT

## 2022-12-20 PROCEDURE — 80053 COMPREHEN METABOLIC PANEL: CPT

## 2022-12-20 PROCEDURE — 302449 STATCHG TRIAGE ONLY (STATISTIC)

## 2022-12-20 PROCEDURE — 84484 ASSAY OF TROPONIN QUANT: CPT

## 2023-01-05 RX ORDER — RIZATRIPTAN BENZOATE 10 MG/1
TABLET, ORALLY DISINTEGRATING ORAL
Qty: 10 TABLET | Refills: 0 | Status: SHIPPED | OUTPATIENT
Start: 2023-01-05 | End: 2023-05-09

## 2023-01-05 NOTE — TELEPHONE ENCOUNTER
Received request via: PatientPatient    Was the patient seen in the last year in this department? Yes    Does the patient have an active prescription (recently filled or refills available) for medication(s) requested? No    Does the patient have assisted Plus and need 100 day supply (blood pressure, diabetes and cholesterol meds only)? Patient does not have SCP

## 2023-01-19 ENCOUNTER — OFFICE VISIT (OUTPATIENT)
Dept: MEDICAL GROUP | Facility: CLINIC | Age: 63
End: 2023-01-19
Payer: MEDICAID

## 2023-01-19 DIAGNOSIS — Z01.818 PRE-OP EVALUATION: ICD-10-CM

## 2023-01-19 DIAGNOSIS — D51.0 VITAMIN B12 DEFICIENCY ANEMIA DUE TO INTRINSIC FACTOR DEFICIENCY: ICD-10-CM

## 2023-01-19 DIAGNOSIS — Z00.00 HEALTHCARE MAINTENANCE: ICD-10-CM

## 2023-01-19 DIAGNOSIS — G89.29 CHRONIC MIDLINE LOW BACK PAIN WITH BILATERAL SCIATICA: ICD-10-CM

## 2023-01-19 DIAGNOSIS — R32 URINARY INCONTINENCE, UNSPECIFIED TYPE: ICD-10-CM

## 2023-01-19 DIAGNOSIS — I10 PRIMARY HYPERTENSION: ICD-10-CM

## 2023-01-19 DIAGNOSIS — R07.9 CHEST PAIN, UNSPECIFIED TYPE: ICD-10-CM

## 2023-01-19 DIAGNOSIS — M54.42 CHRONIC MIDLINE LOW BACK PAIN WITH BILATERAL SCIATICA: ICD-10-CM

## 2023-01-19 DIAGNOSIS — E05.90 HYPERTHYROIDISM: ICD-10-CM

## 2023-01-19 DIAGNOSIS — M54.41 CHRONIC MIDLINE LOW BACK PAIN WITH BILATERAL SCIATICA: ICD-10-CM

## 2023-01-19 PROCEDURE — 99213 OFFICE O/P EST LOW 20 MIN: CPT | Mod: 25,GE | Performed by: FAMILY MEDICINE

## 2023-01-19 PROCEDURE — 90746 HEPB VACCINE 3 DOSE ADULT IM: CPT | Performed by: FAMILY MEDICINE

## 2023-01-19 PROCEDURE — 96372 THER/PROPH/DIAG INJ SC/IM: CPT | Mod: 59 | Performed by: FAMILY MEDICINE

## 2023-01-19 PROCEDURE — 90471 IMMUNIZATION ADMIN: CPT | Performed by: FAMILY MEDICINE

## 2023-01-19 RX ORDER — METHIMAZOLE 5 MG/1
5 TABLET ORAL DAILY
Qty: 90 TABLET | Refills: 3 | Status: SHIPPED | OUTPATIENT
Start: 2023-01-19 | End: 2024-02-13 | Stop reason: SDUPTHER

## 2023-01-19 RX ORDER — CYANOCOBALAMIN 1000 UG/ML
1000 INJECTION, SOLUTION INTRAMUSCULAR; SUBCUTANEOUS ONCE
Status: COMPLETED | OUTPATIENT
Start: 2023-01-19 | End: 2023-01-19

## 2023-01-19 RX ORDER — PROPRANOLOL HYDROCHLORIDE 40 MG/1
40 TABLET ORAL
Qty: 30 TABLET | Refills: 1 | Status: SHIPPED | OUTPATIENT
Start: 2023-01-19 | End: 2023-03-27

## 2023-01-19 RX ORDER — OXYBUTYNIN CHLORIDE 10 MG/1
10 TABLET, EXTENDED RELEASE ORAL DAILY
Qty: 30 TABLET | Refills: 3 | Status: SHIPPED | OUTPATIENT
Start: 2023-01-19

## 2023-01-19 RX ORDER — LISINOPRIL 10 MG/1
10 TABLET ORAL DAILY
Qty: 90 TABLET | Refills: 3 | Status: SHIPPED | OUTPATIENT
Start: 2023-01-19

## 2023-01-19 RX ADMIN — CYANOCOBALAMIN 1000 MCG: 1000 INJECTION, SOLUTION INTRAMUSCULAR; SUBCUTANEOUS at 10:50

## 2023-01-19 ASSESSMENT — FIBROSIS 4 INDEX: FIB4 SCORE: 1.33

## 2023-01-19 NOTE — PROGRESS NOTES
Subjective:   Luz Ahumada is a 63 y.o. female here for the evaluation and management of Medication Refill (Referrals)      Problem   Chest Pain    Endorses chest pain that occurred during the month of December.  Went to the ED, but was never seen due to increased wait time and patient left AMA.  Had an EKG performed at that time that was largely unremarkable.  Is supposed to have surgery down in [place], but the surgeon there would like her to be cleared by cardiology prior to the surgery.  Is requesting a referral to cardiology.     Urinary Incontinence    History of urinary incontinence.  Takes oxybutynin 10 mg daily.  Needs a refill.     Primary Hypertension    3 to 4-months since being diagnosed with hypertension.  Stable on lisinopril 10 mg.  Denies any side effects of the medication.     Vitamin B12 Deficiency Anemia Due to Intrinsic Factor Deficiency    Reports a history of B12 deficiency.  States she is due for an injection.     Low Back Pain    Low back pain present since at least July 2022.  Diagnosed with a fracture and has been seeing a surgeon in [place] for it.  Takes gabapentin and meloxicam, with some improvement in symptoms.  Difficulty with staying in 1 spot for too long.  Surgical planning for intervention, but needs cardiac clearance first     Hyperthyroidism    Patient has a history of hyperthyroidism.  States she has not had her thyroid checked in quite some time.  Currently stable on 5 mg of methimazole.  Needs a refill.         ROS    Current Outpatient Medications   Medication Sig Dispense Refill    lisinopril (PRINIVIL) 10 MG Tab Take 1 Tablet by mouth every day. 90 Tablet 3    methimazole (TAPAZOLE) 5 MG Tab Take 1 Tablet by mouth every day. 90 Tablet 3    oxybutynin SR (DITROPAN-XL) 10 MG CR tablet Take 1 Tablet by mouth every day. 30 Tablet 3    propranolol (INDERAL) 40 MG Tab Take 1 Tablet by mouth 1 time a day as needed (as needed for tremors). 30 Tablet 1    rizatriptan  "(MAXALT-MLT) 10 MG disintegrating tablet TAKE 1 TABLET BY MOUTH ONE TIME AS NEEDED FOR MIGRAINE FOR 1 DOSE, MAX 9/23DAYS 10 Tablet 0    traZODone (DESYREL) 50 MG Tab TAKE 2 TABLETS BY MOUTH AT BEDTIME AS NEEDED FOR SLEEP. 30 Tablet 2    gabapentin (NEURONTIN) 300 MG Cap Take 300 mg by mouth every 8 hours.      sertraline (ZOLOFT) 100 MG Tab TAKE 1 TABLET BY MOUTH EVERY DAY 30 Tablet 2    meloxicam (MOBIC) 7.5 MG Tab Take 1 Tablet by mouth every day. 90 Tablet 3    cyclobenzaprine (FLEXERIL) 10 mg Tab Take 1 Tablet by mouth 3 times a day as needed for Moderate Pain. 90 Tablet 3    omeprazole (PRILOSEC) 20 MG delayed-release capsule Take 1 Capsule by mouth every day. 90 Capsule 2    QVAR REDIHALER 40 MCG/ACT inhaler 1 Puff 2 times a day.      albuterol 108 (90 Base) MCG/ACT Aero Soln inhalation aerosol Inhale 1-2 Puffs by mouth 2 times a day as needed for Shortness of Breath.      budesonide (PULMICORT) 0.5 MG/2ML Suspension 500 mcg by Nebulization route 2 times a day.       No current facility-administered medications for this visit.       Allergies  Hydrocodone-acetaminophen    Past Medical History:   Diagnosis Date    Anemia     Anesthesia     \"woke up during surgery\"    Arthritis     Asthma     Blood clotting disorder (HCC)     L leg    Bronchitis 2016    Heart burn     Pain     joint pain    Psychiatric problem     depression/anxiety    Urinary bladder disorder     Urinary incontinence        Patient Active Problem List    Diagnosis Date Noted    Chest pain 01/19/2023    Urinary incontinence 01/19/2023    Primary hypertension 10/21/2022    Injection of surface of right eye 02/18/2022    Cobalamin deficiency 12/14/2021    Asthma 10/25/2021    Gastroesophageal reflux disease 10/25/2021    Mixed anxiety depressive disorder 10/25/2021    Injury of right shoulder 10/25/2021    Arthritis 09/14/2021    High risk sexual behavior 09/14/2021    Migraine 03/11/2021    Subclinical hyperthyroidism 01/09/2020    Neck pain, " "chronic 12/27/2019    Essential tremor 12/17/2019    Anxiety disorder, unspecified 12/17/2019    Vitamin B12 deficiency anemia due to intrinsic factor deficiency 12/17/2019    Rotator cuff tear 01/22/2019    Varicose veins of lower extremity with inflammation 01/09/2019    Recurrent ventral hernia 01/22/2018    Thoracic back pain 08/23/2017    Anemia 03/06/2017    Deep venous thrombosis (HCC) 03/06/2017    Low back pain 04/08/2016    Insomnia 04/08/2016    Hip pain, right 04/08/2016    Chronic maxillary sinusitis 02/24/2016    Hernia, incisional 02/24/2016    Obesity 02/24/2016    Goiter, toxic, multinodular 09/18/2014    Hyperthyroidism 07/02/2014    Status post gastric bypass for obesity 03/19/2014       Past Surgical History  Past Surgical History:   Procedure Laterality Date    VENTRAL HERNIA REPAIR  1/22/2018    Procedure: VENTRAL HERNIA REPAIR RECURRENT INCISIONAL;  Surgeon: John H Ganser, M.D.;  Location: SURGERY [facility];  Service: General    GYN SURGERY  2016    hysterectomy    VENTRAL HERNIA REPAIR LAPAROSCOPIC  10/29/08    Performed by PORSHA PAREDES at SURGERY [facility]    BOWEL RESECTION LAPAROSCOPIC  7/12/08    Performed by PORSHA PAREDES at SURGERY [facility]    GASTRIC BYPASS LAPAROSCOPIC  7/2/08    CHOLECYSTECTOMY      HERNIA REPAIR      X2, PROLAPSED VAG. TUMOR REMOVAL?       Social History     Socioeconomic History    Marital status: Legally    Tobacco Use    Smoking status: Some Days     Packs/day: 0.50     Years: 10.00     Pack years: 5.00     Types: Cigarettes    Smokeless tobacco: Never   Substance and Sexual Activity    Alcohol use: Yes     Comment: 8 q month    Drug use: No        Objective:     Vitals:    01/19/23 0956   BP: (P) 130/82   BP Location: (P) Left arm   Patient Position: (P) Sitting   BP Cuff Size: (P) Adult   Pulse: (P) 100   Temp: (P) 36.3 °C (97.4 °F)   TempSrc: (P) Temporal   SpO2: (P) 96%   Weight: (P) 113 kg (249 lb)   Height: (P) 1.626 m (5' 4\") "     Body mass index is 42.74 kg/m² (pended).     Physical Exam  GEN: Alert and oriented, NAD  Cardiac: RRR, no murmurs, normal peripheral perfusion  Respiratory: Lungs CTA B, no wheeze/rhonchi/rales, nonlabored on room air  Abdomen: Left of the midline there is a 1 cm diameter angioma present, no active bleeding present  MSK/back: Nontender to palpation along the midline of the spine from cervical to lumbar regions      Assessment and Plan:   Luz Ahumada is a 63 y.o. female with a Medication Refill (Referrals)     The following was discussed with the patient today.    Problem List Items Addressed This Visit       Low back pain     Chronic, stable  Seeing a surgeon in [place] - planning surgical intervention  Personally reviewed x-ray from July 2022 and agree with findings as listed by radiology: Bilateral pars breaks at L5 with grade 2 anterolisthesis of L5 on S1.  No sign of acute compression fracture  Continue meloxicam and gabapentin         Hyperthyroidism     Chronic, stable  Does endorse some chest pain, which leads to some concerns about her thyroid levels  Will obtain TSH  Refilling methimazole-Will recheck the patient and adjust dose as necessary.  Could also consider endocrine referral         Relevant Medications    methimazole (TAPAZOLE) 5 MG Tab    Other Relevant Orders    TSH WITH REFLEX TO FT4    Vitamin B12 deficiency anemia due to intrinsic factor deficiency     Chronic, stable  Does have a history of gastric bypass surgery  B12 injection in clinic today (1/19/2023)         Primary hypertension     Chronic, stable  Blood pressure in clinic 130/82  Will refill lisinopril 10 mg for 90 days and 3 refills         Relevant Medications    lisinopril (PRINIVIL) 10 MG Tab    propranolol (INDERAL) 40 MG Tab    Chest pain     New problem to provider  Personally reviewed EKG from December 2022-normal sinus rhythm, possible T wave change in the anterior septal leads in comparison to an EKG from 2018  Will  check thyroid-does have a history of hypothyroidism  Referral sent to cardiology    Patient has had extensive surgical history as displayed in the past surgical history section of this note.  No noted history of surgical complications from a cardiac standpoint.           Urinary incontinence     Chronic, stable  Oxybutynin prescription sent in         Relevant Medications    oxybutynin SR (DITROPAN-XL) 10 MG CR tablet     Other Visit Diagnoses       Pre-op evaluation        Relevant Orders    REFERRAL TO CARDIOLOGY    Referral to Dermatology    Healthcare maintenance        Relevant Orders    Hepatitis B Vaccine Adult 20+ (Completed)            Followup: Return With PCP as needed.    Nish Fonseca M.D.    Patient discussed with Dr. Gurmeet MD.    Please note that this dictation was created using voice recognition software. I have made every reasonable attempt to correct obvious errors, but I expect that there are errors of grammar and possibly content that I did not discover before finalizing the note.

## 2023-01-19 NOTE — ASSESSMENT & PLAN NOTE
Chronic, stable  Blood pressure in clinic 130/82  Will refill lisinopril 10 mg for 90 days and 3 refills

## 2023-01-19 NOTE — ASSESSMENT & PLAN NOTE
Chronic, stable  Seeing a surgeon in [place] - planning surgical intervention  Personally reviewed x-ray from July 2022 and agree with findings as listed by radiology: Bilateral pars breaks at L5 with grade 2 anterolisthesis of L5 on S1.  No sign of acute compression fracture  Continue meloxicam and gabapentin

## 2023-01-19 NOTE — ASSESSMENT & PLAN NOTE
Chronic, stable  Does endorse some chest pain, which leads to some concerns about her thyroid levels  Will obtain TSH  Refilling methimazole-Will recheck the patient and adjust dose as necessary.  Could also consider endocrine referral

## 2023-01-19 NOTE — ASSESSMENT & PLAN NOTE
New problem to provider  Personally reviewed EKG from December 2022-normal sinus rhythm, possible T wave change in the anterior septal leads in comparison to an EKG from 2018  Will check thyroid-does have a history of hypothyroidism  Referral sent to cardiology    Patient has had extensive surgical history as displayed in the past surgical history section of this note.  No noted history of surgical complications from a cardiac standpoint.

## 2023-01-19 NOTE — ASSESSMENT & PLAN NOTE
Chronic, stable  Does have a history of gastric bypass surgery  B12 injection in clinic today (1/19/2023)

## 2023-02-09 ENCOUNTER — APPOINTMENT (RX ONLY)
Dept: URBAN - METROPOLITAN AREA CLINIC 103 | Facility: CLINIC | Age: 63
Setting detail: DERMATOLOGY
End: 2023-02-09

## 2023-02-09 DIAGNOSIS — D485 NEOPLASM OF UNCERTAIN BEHAVIOR OF SKIN: ICD-10-CM

## 2023-02-09 DIAGNOSIS — L82.1 OTHER SEBORRHEIC KERATOSIS: ICD-10-CM

## 2023-02-09 PROBLEM — D48.5 NEOPLASM OF UNCERTAIN BEHAVIOR OF SKIN: Status: ACTIVE | Noted: 2023-02-09

## 2023-02-09 PROCEDURE — ? BIOPSY BY SHAVE METHOD

## 2023-02-09 PROCEDURE — 99202 OFFICE O/P NEW SF 15 MIN: CPT | Mod: 25

## 2023-02-09 PROCEDURE — 11102 TANGNTL BX SKIN SINGLE LES: CPT

## 2023-02-09 PROCEDURE — ? COUNSELING

## 2023-02-09 ASSESSMENT — LOCATION SIMPLE DESCRIPTION DERM
LOCATION SIMPLE: RIGHT FOREARM
LOCATION SIMPLE: ABDOMEN
LOCATION SIMPLE: RIGHT FOREARM
LOCATION SIMPLE: LEFT ELBOW

## 2023-02-09 ASSESSMENT — LOCATION DETAILED DESCRIPTION DERM
LOCATION DETAILED: RIGHT DISTAL DORSAL FOREARM
LOCATION DETAILED: LEFT LATERAL ABDOMEN
LOCATION DETAILED: LEFT ELBOW
LOCATION DETAILED: LEFT RIB CAGE
LOCATION DETAILED: RIGHT DISTAL DORSAL FOREARM

## 2023-02-09 ASSESSMENT — LOCATION ZONE DERM
LOCATION ZONE: TRUNK
LOCATION ZONE: ARM
LOCATION ZONE: ARM

## 2023-02-09 NOTE — PROCEDURE: BIOPSY BY SHAVE METHOD
Body Location Override (Optional - Billing Will Still Be Based On Selected Body Map Location If Applicable): Left mid abdomen
Detail Level: Detailed
Depth Of Biopsy: dermis
Was A Bandage Applied: Yes
Size Of Lesion In Cm: 1
X Size Of Lesion In Cm: 0
Biopsy Type: H and E
Biopsy Method: Dermablade
Anesthesia Type: 1% lidocaine with epinephrine
Anesthesia Volume In Cc (Will Not Render If 0): 0.5
Hemostasis: Electrocautery
Wound Care: Vaseline
Dressing: bandage
Destruction After The Procedure: No
Type Of Destruction Used: Curettage
Curettage Text: The wound bed was treated with curettage after the biopsy was performed.
Cryotherapy Text: The wound bed was treated with cryotherapy after the biopsy was performed.
Electrodesiccation Text: The wound bed was treated with electrodesiccation after the biopsy was performed.
Electrodesiccation And Curettage Text: The wound bed was treated with electrodesiccation and curettage after the biopsy was performed.
Silver Nitrate Text: The wound bed was treated with silver nitrate after the biopsy was performed.
Path Notes (To The Dermatopathologist): Size: 1.0cm R/O: hemangioma
Consent: Written consent was obtained and risks were reviewed including but not limited to scarring, infection, bleeding, scabbing, incomplete removal, nerve damage and allergy to anesthesia.
Post-Care Instructions: I reviewed with the patient in detail post-care instructions. Patient is to keep the biopsy site dry overnight, and then apply bacitracin twice daily until healed. Patient may apply hydrogen peroxide soaks to remove any crusting.
Notification Instructions: Patient will be notified of biopsy results. However, patient instructed to call the office if not contacted within 2 weeks.
Billing Type: [employer]
Information: Selecting Yes will display possible errors in your note based on the variables you have selected. This validation is only offered as a suggestion for you. PLEASE NOTE THAT THE VALIDATION TEXT WILL BE REMOVED WHEN YOU FINALIZE YOUR NOTE. IF YOU WANT TO FAX A PRELIMINARY NOTE YOU WILL NEED TO TOGGLE THIS TO 'NO' IF YOU DO NOT WANT IT IN YOUR FAXED NOTE.

## 2023-02-24 RX ORDER — OMEPRAZOLE 20 MG/1
20 CAPSULE, DELAYED RELEASE ORAL DAILY
Qty: 90 CAPSULE | Refills: 2 | Status: SHIPPED | OUTPATIENT
Start: 2023-02-24

## 2023-03-24 RX ORDER — SERTRALINE HYDROCHLORIDE 100 MG/1
TABLET, FILM COATED ORAL
Qty: 30 TABLET | Refills: 2 | Status: SHIPPED | OUTPATIENT
Start: 2023-03-24 | End: 2023-07-17

## 2023-03-27 RX ORDER — PROPRANOLOL HYDROCHLORIDE 40 MG/1
TABLET ORAL
Qty: 60 TABLET | Refills: 2 | Status: SHIPPED | OUTPATIENT
Start: 2023-03-27 | End: 2023-05-02

## 2023-05-02 RX ORDER — PROPRANOLOL HYDROCHLORIDE 40 MG/1
TABLET ORAL
Qty: 30 TABLET | Refills: 1 | Status: SHIPPED | OUTPATIENT
Start: 2023-05-02 | End: 2023-10-17

## 2023-05-09 RX ORDER — RIZATRIPTAN BENZOATE 10 MG/1
TABLET, ORALLY DISINTEGRATING ORAL
Qty: 9 TABLET | Refills: 1 | Status: SHIPPED | OUTPATIENT
Start: 2023-05-09 | End: 2023-07-18

## 2023-05-18 RX ORDER — TRAZODONE HYDROCHLORIDE 50 MG/1
100 TABLET ORAL NIGHTLY PRN
Qty: 30 TABLET | Refills: 2 | Status: SHIPPED | OUTPATIENT
Start: 2023-05-18 | End: 2023-11-06

## 2023-07-17 RX ORDER — SERTRALINE HYDROCHLORIDE 100 MG/1
TABLET, FILM COATED ORAL
Qty: 30 TABLET | Refills: 2 | Status: SHIPPED | OUTPATIENT
Start: 2023-07-17 | End: 2023-10-17

## 2023-07-18 RX ORDER — RIZATRIPTAN BENZOATE 10 MG/1
TABLET, ORALLY DISINTEGRATING ORAL
Qty: 9 TABLET | Refills: 1 | Status: SHIPPED | OUTPATIENT
Start: 2023-07-18 | End: 2024-02-15

## 2023-07-28 NOTE — PROGRESS NOTES
"Subjective:     CC: Lower back pain    HPI:   Luz presents today with     Problem   Low Back Pain    Chronic issue. Associated with numbness earlier this week paraspinal in thoracic section. Can't sit or stand long because of pain. Better with walking.  Taking mobic and flexeril for pain.  No urinary incontinence.  No fevers.         Current Outpatient Medications Ordered in Epic   Medication Sig Dispense Refill   • cyclobenzaprine (FLEXERIL) 10 mg Tab Take 1 Tablet by mouth 3 times a day as needed for Moderate Pain. 90 Tablet 3   • propranolol (INDERAL) 40 MG Tab TAKE 1 TABLET BY MOUTH 2 TIMES A DAY 60 Tablet 2   • sertraline (ZOLOFT) 100 MG Tab TAKE 1 TABLET BY MOUTH EVERY DAY 30 Tablet 2   • meloxicam (MOBIC) 7.5 MG Tab Take 1 Tablet by mouth every day. 60 Tablet 1   • omeprazole (PRILOSEC) 20 MG delayed-release capsule Take 1 Capsule by mouth every day. 90 Capsule 2   • traZODone (DESYREL) 50 MG Tab Take 2 Tablets by mouth at bedtime as needed for Sleep. 30 Tablet 2   • rizatriptan (MAXALT-MLT) 10 MG disintegrating tablet Take 1 Tablet by mouth one time as needed for Migraine for up to 1 dose. 10 Tablet 0   • QVAR REDIHALER 40 MCG/ACT inhaler 1 Puff 2 times a day.     • methimazole (TAPAZOLE) 5 MG Tab Take 1 Tablet by mouth every day. 90 Tablet 3   • albuterol 108 (90 Base) MCG/ACT Aero Soln inhalation aerosol Inhale 1-2 Puffs by mouth 2 times a day as needed for Shortness of Breath.     • budesonide (PULMICORT) 0.5 MG/2ML Suspension 500 mcg by Nebulization route 2 times a day.       No current [facility]-ordered facility-administered medications on file.       Objective:     Exam:  BP (!) 156/88 (BP Location: Right arm, Patient Position: Sitting, BP Cuff Size: Large adult)   Pulse (!) 50   Resp 12   Ht 1.651 m (5' 5\")   Wt 106 kg (234 lb)   SpO2 96%   BMI 38.94 kg/m²  Body mass index is 38.94 kg/m².     BP on repeat was 130/95    General: Normal appearing. No distress..  Pulmonary: Clear to ausculation.  " Normal effort. No rales, ronchi, or wheezing.  Cardiovascular: Regular rate and rhythm without murmur.   Skin: Warm and dry.  No obvious lesions.  Musculoskeletal: Normal gait.  Tenderness to paraspinal area and lumbar region, limitations to forward flexion, backward flexion, trunk rotation  Psych: Normal mood and affect. Alert and oriented x3. Judgment and insight is normal.      Assessment & Plan:     62 y.o. female with the following -     Problem List Items Addressed This Visit     Low back pain     Acute on chronic.  Patient has limited mobility with forward flexion, backward flexion, and trunk rotation.  There is tenderness in the paraspinal area in the lumbar region.  There are no red flag signs suggestive of cauda equina or epidural abscess.  Lumbar x-ray in house today not concerning on my read.  Waiting on radiologist read.  We will send for physical therapy and refer to [facility] [place] per the patient's request.  In the meantime, recommended conservative treatment with continued NSAIDs, heat packs, and increased stretching.           Relevant Orders    Referral to Orthopedics    DX-LUMBAR SPINE-2 OR 3 VIEWS    Referral to Physical Therapy            No follow-ups on file.    Nancy Varner MD   PGY-3         no

## 2023-08-15 ENCOUNTER — APPOINTMENT (OUTPATIENT)
Dept: MEDICAL GROUP | Facility: CLINIC | Age: 63
End: 2023-08-15
Payer: MEDICAID

## 2023-10-17 RX ORDER — PROPRANOLOL HYDROCHLORIDE 40 MG/1
40 TABLET ORAL 2 TIMES DAILY
Qty: 60 TABLET | Refills: 2 | Status: SHIPPED | OUTPATIENT
Start: 2023-10-17

## 2023-10-17 RX ORDER — SERTRALINE HYDROCHLORIDE 100 MG/1
TABLET, FILM COATED ORAL
Qty: 30 TABLET | Refills: 2 | Status: SHIPPED | OUTPATIENT
Start: 2023-10-17

## 2023-11-06 RX ORDER — TRAZODONE HYDROCHLORIDE 50 MG/1
100 TABLET ORAL NIGHTLY PRN
Qty: 30 TABLET | Refills: 2 | Status: SHIPPED | OUTPATIENT
Start: 2023-11-06 | End: 2024-03-12

## 2024-01-18 ENCOUNTER — TELEPHONE (OUTPATIENT)
Dept: MEDICAL GROUP | Facility: CLINIC | Age: 64
End: 2024-01-18
Payer: MEDICAID

## 2024-01-18 RX ORDER — MELOXICAM 7.5 MG/1
7.5 TABLET ORAL DAILY
Qty: 30 TABLET | Refills: 11 | Status: SHIPPED | OUTPATIENT
Start: 2024-01-18

## 2024-02-12 ENCOUNTER — PATIENT MESSAGE (OUTPATIENT)
Dept: MEDICAL GROUP | Facility: CLINIC | Age: 64
End: 2024-02-12
Payer: MEDICAID

## 2024-02-12 NOTE — TELEPHONE ENCOUNTER
Received request via: Patient    Was the patient seen in the last year in this department? No, last seen 01/2023    Does the patient have an active prescription (recently filled or refills available) for medication(s) requested? No    [facility]    Does the patient have senior living Plus and need 100 day supply (blood pressure, diabetes and cholesterol meds only)? Patient does not have SCP

## 2024-02-13 NOTE — TELEPHONE ENCOUNTER
Received request via: Pharmacy    Was the patient seen in the last year in this department? No    Does the patient have an active prescription (recently filled or refills available) for medication(s) requested? No    [facility]    Does the patient have care home Plus and need 100 day supply (blood pressure, diabetes and cholesterol meds only)? Patient does not have SCP  
no

## 2024-02-15 DIAGNOSIS — E05.20 GOITER, TOXIC, MULTINODULAR: ICD-10-CM

## 2024-02-15 RX ORDER — METHIMAZOLE 5 MG/1
5 TABLET ORAL DAILY
Qty: 90 TABLET | Refills: 0 | Status: SHIPPED | OUTPATIENT
Start: 2024-02-15

## 2024-02-15 RX ORDER — RIZATRIPTAN BENZOATE 10 MG/1
TABLET, ORALLY DISINTEGRATING ORAL
Qty: 9 TABLET | Refills: 1 | Status: SHIPPED | OUTPATIENT
Start: 2024-02-15

## 2024-02-15 NOTE — PROGRESS NOTES
Refill request for methimazole- scheduling patient for visit, and ordering TSH.     Mike Burnett, DO  [employer] [facility] Fellow

## 2024-03-12 DIAGNOSIS — Z13.79 GENETIC SCREENING: ICD-10-CM

## 2024-03-12 DIAGNOSIS — E66.09 CLASS 2 OBESITY DUE TO EXCESS CALORIES WITHOUT SERIOUS COMORBIDITY WITH BODY MASS INDEX (BMI) OF 39.0 TO 39.9 IN ADULT: ICD-10-CM

## 2024-03-12 DIAGNOSIS — I10 PRIMARY HYPERTENSION: ICD-10-CM

## 2024-03-12 RX ORDER — TRAZODONE HYDROCHLORIDE 50 MG/1
100 TABLET ORAL NIGHTLY PRN
Qty: 30 TABLET | Refills: 2 | Status: SHIPPED | OUTPATIENT
Start: 2024-03-12

## 2024-04-15 NOTE — TELEPHONE ENCOUNTER
Received request via: Pharmacy    Was the patient seen in the last year in this department? Yes    Does the patient have an active prescription (recently filled or refills available) for medication(s) requested? No    [facility]    Does the patient have prison Plus and need 100 day supply (blood pressure, diabetes and cholesterol meds only)? Patient does not have SCP

## 2024-04-15 NOTE — TELEPHONE ENCOUNTER
Received request via: Pharmacy    Was the patient seen in the last year in this department? Yes    Does the patient have an active prescription (recently filled or refills available) for medication(s) requested? No    [facility]    Does the patient have correction Plus and need 100 day supply (blood pressure, diabetes and cholesterol meds only)? Patient does not have SCP

## 2024-04-18 RX ORDER — SERTRALINE HYDROCHLORIDE 100 MG/1
TABLET, FILM COATED ORAL
Qty: 30 TABLET | Refills: 2 | Status: SHIPPED | OUTPATIENT
Start: 2024-04-18

## 2024-05-01 RX ORDER — RIZATRIPTAN BENZOATE 10 MG/1
TABLET, ORALLY DISINTEGRATING ORAL
Qty: 9 TABLET | Refills: 1 | Status: SHIPPED | OUTPATIENT
Start: 2024-05-01

## 2024-05-01 NOTE — TELEPHONE ENCOUNTER
Received request via: Pharmacy    Was the patient seen in the last year in this department? No    Does the patient have an active prescription (recently filled or refills available) for medication(s) requested? No    Pharmacy Name: [facility]    Does the patient have care home Plus and need 100 day supply (blood pressure, diabetes and cholesterol meds only)? Patient does not have SCP

## 2024-05-10 DIAGNOSIS — G89.4 CHRONIC PAIN SYNDROME: ICD-10-CM

## 2024-05-15 RX ORDER — METHIMAZOLE 5 MG/1
5 TABLET ORAL DAILY
Qty: 90 TABLET | Refills: 0 | Status: SHIPPED | OUTPATIENT
Start: 2024-05-15

## 2025-01-20 ENCOUNTER — PATIENT MESSAGE (OUTPATIENT)
Dept: MEDICAL GROUP | Facility: CLINIC | Age: 65
End: 2025-01-20
Payer: MEDICAID

## 2025-02-27 NOTE — Clinical Note
REFERRAL APPROVAL NOTICE         Sent on February 27, 2025                   Luz Ahumada  [address]  [facility] [address]                   Dear Ms. Ahumada,    After a careful review of the medical information and benefit coverage, [facility] has processed your referral. See below for additional details.    If applicable, you must be actively enrolled with your insurance for coverage of the authorized service. If you have any questions regarding your coverage, please contact your insurance directly.    REFERRAL INFORMATION   Referral #:  [id number]  Referred-To Department    Referred-By Provider:  Endocrinology    JEWELL Pope   Endocrinology [facility]      [address]  [address]  [facility] [address]  [phone] [address], [address]  [facility] [address] [phone]    Referral Start Date:  02/27/2025  Referral End Date:   02/27/2026           SCHEDULING  If you do not already have an appointment, please call [phone] to make an appointment.   MORE INFORMATION  As a reminder, [facility] ownership has changed, meaning this location is now owned and operated by [facility]. As such, we want to clarify that our patients should expect to receive two separate bills for the services received at [facility] - one representing the [facility] facility fees as the owner of the establishment, and the other to represent the physician's services and subsequent fees. You can speak with your insurance carrier for a pricing estimate by calling the customer service number on the back of your card and ask about charges for a hospital outpatient visit.  If you do not already have a [employer] account, sign up at: [email or web address]  You can access your medical information, make appointments, see lab results, billing information, and more.  If you have questions regarding this referral, please contact  the [facility] Referrals department at:              [phone]. Monday - Friday 7:30AM - 5:00PM.      Sincerely,  [facility]

## 2025-04-22 ENCOUNTER — OFFICE VISIT (OUTPATIENT)
Dept: ENDOCRINOLOGY | Facility: MEDICAL CENTER | Age: 65
End: 2025-04-22
Attending: INTERNAL MEDICINE
Payer: MEDICAID

## 2025-04-22 VITALS
HEIGHT: 64 IN | BODY MASS INDEX: 40.97 KG/M2 | DIASTOLIC BLOOD PRESSURE: 64 MMHG | OXYGEN SATURATION: 97 % | SYSTOLIC BLOOD PRESSURE: 135 MMHG | WEIGHT: 240 LBS | HEART RATE: 79 BPM

## 2025-04-22 DIAGNOSIS — R73.03 PREDIABETES: ICD-10-CM

## 2025-04-22 DIAGNOSIS — E05.90 HYPERTHYROIDISM: ICD-10-CM

## 2025-04-22 DIAGNOSIS — E66.813 OBESITY, CLASS III, BMI 40-49.9 (MORBID OBESITY): ICD-10-CM

## 2025-04-22 DIAGNOSIS — E78.5 DYSLIPIDEMIA: ICD-10-CM

## 2025-04-22 PROCEDURE — 99204 OFFICE O/P NEW MOD 45 MIN: CPT | Performed by: INTERNAL MEDICINE

## 2025-04-22 PROCEDURE — 3078F DIAST BP <80 MM HG: CPT | Performed by: INTERNAL MEDICINE

## 2025-04-22 PROCEDURE — 3075F SYST BP GE 130 - 139MM HG: CPT | Performed by: INTERNAL MEDICINE

## 2025-04-22 RX ORDER — SEMAGLUTIDE 0.68 MG/ML
0.5 INJECTION, SOLUTION SUBCUTANEOUS
Qty: 9 ML | Refills: 11 | Status: SHIPPED | OUTPATIENT
Start: 2025-04-22 | End: 2025-07-09

## 2025-04-22 NOTE — LETTER
April 22, 2025    To Whom It May Concern:         This is confirmation that Luz Ahumada attended her scheduled appointment with Newton Edmondson M.D. on 4/22/25.         If you have any questions please do not hesitate to call me at the phone number listed below.    Sincerely,          Newton Edmondson M.D.  [phone]

## 2025-04-22 NOTE — PROGRESS NOTES
New Patient Note for Endocrinology    Referred by: Celina Sanchez M.D.    Luz Ahumada is a 65 y.o. female who presents today as a new patient with the following Chief Complaint(s): Follow up for Diagnoses of Hyperthyroidism, Dyslipidemia, Obesity, Class III, BMI 40-49.9 (morbid obesity), and Prediabetes were pertinent to this visit.    HPI:  Patient is 65-year-old female with history of hyperthyroidism, prediabetes, obesity, and possible dyslipidemia referred for care  Patient has been seen by endocrinology since approximately 2014 for her treatment of hyperthyroidism.  She has been on methimazole 5 mg and reports that she has been doing well on that dose.  She reports that that dose has not been changed for many years.  Initially, she reports that she was encouraged to have a thyroidectomy for her hyperthyroidism, and she is considering that now.  For the most part she is asymptomatic, but occasionally she does have heart palpitations and dizziness.  She is concerned that this may be related to her thyroid levels.  She was also recently diagnosed with hypertension and so she is uncertain if this might be contributing to the symptom of heart palpitations and dizziness.    No heat or cold intolerance, weight is stable, and patient denies fatigue.  No tremor  No sore throat or fever.  No abdominal tenderness  No recent thyroid labs    She also has a history of excessive weight for which she was treated with gastric bypass by Dr. Kemp.  Following the initial gastric bypass she had sepsis and required surgical correction for a gastric leak for treatment.  She is now following with Dr. Ganser for additional problems related to her gastric bypass.    She has a history of prediabetes with an A1c of 6.0% on 1/5/2024    She was initially referred for dyslipidemia, so we will check her lipid panel, though I think the referral was likely for hyperthyroidism      ROS:  Per HPI, otherwise: Negative    Past Medical  History:  Patient Active Problem List    Diagnosis Date Noted    Chest pain 01/19/2023    Urinary incontinence 01/19/2023    Primary hypertension 10/21/2022    Injection of surface of right eye 02/18/2022    Cobalamin deficiency 12/14/2021    Asthma 10/25/2021    Gastroesophageal reflux disease 10/25/2021    Mixed anxiety depressive disorder 10/25/2021    Injury of right shoulder 10/25/2021    Arthritis 09/14/2021    High risk sexual behavior 09/14/2021    Migraine 03/11/2021    Subclinical hyperthyroidism 01/09/2020    Neck pain, chronic 12/27/2019    Essential tremor 12/17/2019    Anxiety disorder, unspecified 12/17/2019    Vitamin B12 deficiency anemia due to intrinsic factor deficiency 12/17/2019    Rotator cuff tear 01/22/2019    Varicose veins of lower extremity with inflammation 01/09/2019    Recurrent ventral hernia 01/22/2018    Thoracic back pain 08/23/2017    Anemia 03/06/2017    Deep venous thrombosis (HCC) 03/06/2017    Low back pain 04/08/2016    Insomnia 04/08/2016    Hip pain, right 04/08/2016    Chronic maxillary sinusitis 02/24/2016    Hernia, incisional 02/24/2016    Obesity 02/24/2016    Goiter, toxic, multinodular 09/18/2014    Hyperthyroidism 07/02/2014    Status post gastric bypass for obesity 03/19/2014       Past Surgical History:  Past Surgical History:   Procedure Laterality Date    VENTRAL HERNIA REPAIR  1/22/2018    Procedure: VENTRAL HERNIA REPAIR RECURRENT INCISIONAL;  Surgeon: John H Ganser, M.D.;  Location: [facility];  Service: General    GYN SURGERY  2016    hysterectomy    VENTRAL HERNIA REPAIR LAPAROSCOPIC  10/29/08    Performed by PORSHA PAREDES at SURGERY [facility]    BOWEL RESECTION LAPAROSCOPIC  7/12/08    Performed by PORSHA PAREDES at SURGERY [facility]    GASTRIC BYPASS LAPAROSCOPIC  7/2/08    CHOLECYSTECTOMY      HERNIA REPAIR      X2, PROLAPSED VAG. TUMOR REMOVAL?       Allergies:  Hydrocodone-acetaminophen    Social History:  Social History      Socioeconomic History    Marital status: Legally      Spouse name: Not on file    Number of children: Not on file    Years of education: Not on file    Highest education level: Not on file   Occupational History    Not on file   Tobacco Use    Smoking status: Some Days     Current packs/day: 0.50     Average packs/day: 0.5 packs/day for 10.0 years (5.0 ttl pk-yrs)     Types: Cigarettes    Smokeless tobacco: Never   Substance and Sexual Activity    Alcohol use: Yes     Comment: 8 q month    Drug use: No    Sexual activity: Not on file   Other Topics Concern    Not on file   Social History Narrative    Not on file     Social Drivers of Health     Financial Resource Strain: Not on file   Food Insecurity: Not on file   Transportation Needs: Not on file   Physical Activity: Not on file   Stress: Not on file   Social Connections: Not on file   Intimate Partner Violence: Not on file   Housing Stability: Not on file       Family History:  No family history on file.    Medications:    Current Outpatient Medications:     diclofenac sodium (VOLTAREN) 1 % Gel, APPLY 2 G TOPICALLY 4 TIMES A DAY AS NEEDED (APPLY TO AFFECTED AREAS AS NEEDED.)., Disp: 100 g, Rfl: 1    methimazole (TAPAZOLE) 5 MG Tab, TAKE 1 TABLET BY MOUTH EVERY DAY, Disp: 90 Tablet, Rfl: 0    rizatriptan (MAXALT-MLT) 10 MG disintegrating tablet, TAKE 1 TABLET BY MOUTH ONE TIME AS NEEDED FOR MIGRAINE FOR 1 DOSE, MAX 9/23DAYS, Disp: 9 Tablet, Rfl: 1    sertraline (ZOLOFT) 100 MG Tab, TAKE 1 TABLET BY MOUTH EVERY DAY, Disp: 30 Tablet, Rfl: 2    traZODone (DESYREL) 50 MG Tab, TAKE 2 TABLETS BY MOUTH AT BEDTIME AS NEEDED FOR SLEEP., Disp: 30 Tablet, Rfl: 2    meloxicam (MOBIC) 7.5 MG Tab, TAKE 1 TABLET BY MOUTH EVERY DAY, Disp: 30 Tablet, Rfl: 11    propranolol (INDERAL) 40 MG Tab, TAKE 1 TABLET BY MOUTH TWICE A DAY, Disp: 60 Tablet, Rfl: 2    omeprazole (PRILOSEC) 20 MG delayed-release capsule, Take 1 Capsule by mouth every day., Disp: 90 Capsule, Rfl:  "2    gabapentin (NEURONTIN) 300 MG Cap, Take 600 mg by mouth 3 times a day., Disp: , Rfl:     cyclobenzaprine (FLEXERIL) 10 mg Tab, Take 1 Tablet by mouth 3 times a day as needed for Moderate Pain., Disp: 90 Tablet, Rfl: 3    QVAR REDIHALER 40 MCG/ACT inhaler, 1 Puff 2 times a day., Disp: , Rfl:     albuterol 108 (90 Base) MCG/ACT Aero Soln inhalation aerosol, Inhale 1-2 Puffs by mouth 2 times a day as needed for Shortness of Breath., Disp: , Rfl:     budesonide (PULMICORT) 0.5 MG/2ML Suspension, 500 mcg by Nebulization route 2 times a day., Disp: , Rfl:     lisinopril (PRINIVIL) 10 MG Tab, Take 1 Tablet by mouth every day. (Patient not taking: Reported on 4/22/2025), Disp: 90 Tablet, Rfl: 3    oxybutynin SR (DITROPAN-XL) 10 MG CR tablet, Take 1 Tablet by mouth every day., Disp: 30 Tablet, Rfl: 3    Physical Examination:   Vital signs: /64   Pulse 79   Ht 1.626 m (5' 4\")   Wt 109 kg (240 lb)   SpO2 97%   BMI 41.20 kg/m²   General: No distress, cooperative, well dressed and well nourished.   Eyes: No scleral icterus or discharge  ENMT: Normal on external inspection of nose, lips, No nasal drainage   Resp: Normal effort  Extremities: No edema bilateral extremities  Neuro: Alert and oriented  Skin: No rash, No Ulcers  Psych: Normal mood and affect      Assessment and Plan:    1. Hyperthyroidism  No recent thyroid labs  Cont mtm 5 qd  F/u in 6-8 weeks with labs    - HEMOGLOBIN A1C; Future  - CBC WITH DIFFERENTIAL; Future  - Comp Metabolic Panel; Future  - TSH+FREE T4  - TRIIDOTHYRONINE; Future  - Lipid Profile; Future    2. Dyslipidemia  Recheck lipid panel and review at f/u appt    - HEMOGLOBIN A1C; Future  - CBC WITH DIFFERENTIAL; Future  - Comp Metabolic Panel; Future  - TSH+FREE T4  - TRIIDOTHYRONINE; Future  - Lipid Profile; Future    3. Obesity, Class III, BMI 40-49.9 (morbid obesity)  Will put pt on ozempic for prediabetes - hope for improvement with weight    - HEMOGLOBIN A1C; Future  - CBC WITH " DIFFERENTIAL; Future  - Comp Metabolic Panel; Future  - TSH+FREE T4  - TRIIDOTHYRONINE; Future  - Lipid Profile; Future    4. Prediabetes  Start ozempic  Recheck A1c prior to f/u    - HEMOGLOBIN A1C; Future  - CBC WITH DIFFERENTIAL; Future  - Comp Metabolic Panel; Future  - TSH+FREE T4  - TRIIDOTHYRONINE; Future  - Lipid Profile; Future        Newton Edmondson M.D.

## 2025-05-22 ENCOUNTER — TELEPHONE (OUTPATIENT)
Dept: ENDOCRINOLOGY | Facility: MEDICAL CENTER | Age: 65
End: 2025-05-22
Payer: MEDICAID

## 2025-06-03 ENCOUNTER — OFFICE VISIT (OUTPATIENT)
Dept: ENDOCRINOLOGY | Facility: MEDICAL CENTER | Age: 65
End: 2025-06-03
Attending: INTERNAL MEDICINE
Payer: MEDICAID

## 2025-06-03 VITALS
HEART RATE: 79 BPM | DIASTOLIC BLOOD PRESSURE: 51 MMHG | WEIGHT: 237 LBS | BODY MASS INDEX: 39.49 KG/M2 | OXYGEN SATURATION: 95 % | HEIGHT: 65 IN | SYSTOLIC BLOOD PRESSURE: 142 MMHG

## 2025-06-03 DIAGNOSIS — E05.90 HYPERTHYROIDISM: Primary | ICD-10-CM

## 2025-06-03 DIAGNOSIS — R73.03 PREDIABETES: ICD-10-CM

## 2025-06-03 DIAGNOSIS — E66.813 OBESITY, CLASS III, BMI 40-49.9 (MORBID OBESITY): ICD-10-CM

## 2025-06-03 PROCEDURE — 99213 OFFICE O/P EST LOW 20 MIN: CPT | Performed by: INTERNAL MEDICINE

## 2025-06-03 PROCEDURE — 99214 OFFICE O/P EST MOD 30 MIN: CPT | Performed by: INTERNAL MEDICINE

## 2025-06-03 PROCEDURE — 3078F DIAST BP <80 MM HG: CPT | Performed by: INTERNAL MEDICINE

## 2025-06-03 PROCEDURE — 3077F SYST BP >= 140 MM HG: CPT | Performed by: INTERNAL MEDICINE

## 2025-06-03 RX ORDER — METHIMAZOLE 5 MG/1
5 TABLET ORAL DAILY
Qty: 90 TABLET | Refills: 1 | Status: SHIPPED | OUTPATIENT
Start: 2025-06-03 | End: 2025-11-30

## 2025-06-07 NOTE — PROGRESS NOTES
Established Patient    Patient Care Team:  Celina Sanchez M.D. as PCP - General (Family Medicine)    Luz Ahumada is a 65 y.o. female who presents today with the following Chief Complaint(s): Follow up for The primary encounter diagnosis was Hyperthyroidism. Diagnoses of Obesity, Class III, BMI 40-49.9 (morbid obesity) and Prediabetes were also pertinent to this visit.    HPI:  Patient is 65-year-old female with history of hyperthyroidism, prediabetes, obesity, and possible dyslipidemia referred for care  Patient has been seen by endocrinology since approximately 2014 for her treatment of hyperthyroidism.  She has been on methimazole 5 mg and reports that she has been doing well on that dose.  She reports that that dose has not been changed for many years.  Initially, she reports that she was encouraged to have a thyroidectomy for her hyperthyroidism, and she is considering that now.  For the most part she is asymptomatic, but occasionally she does have heart palpitations and dizziness.  She is concerned that this may be related to her thyroid levels.  She was also recently diagnosed with hypertension and so she is uncertain if this might be contributing to the symptom of heart palpitations and dizziness.    No heat or cold intolerance, weight is stable, and patient denies fatigue.  No tremor  No sore throat or fever.  No abdominal tenderness  No recent thyroid labs    She also has a history of excessive weight for which she was treated with gastric bypass by Dr. Kemp.  Following the initial gastric bypass she had sepsis and required surgical correction for a gastric leak for treatment.  She is now following with Dr. Ganser for additional problems related to her gastric bypass.    She has a history of prediabetes with an A1c of 6.0% on 1/5/2024 5/30/25 creatinine 0.68, EGFR 97, LDL 66, A1c 5.6%, total T3 83, ANC 4.99, AST/ALT 15/11  Patient reports that she has lost 6 pounds on Ozempic and increased  "the dose to 0.5 mg in early June 2025  Patient also reports that she becomes more sleepy and tired when she is off of her methimazole.  She does report that she has had some toe pain in her right fourth toe over the last few days    ROS:  Per HPI, otherwise: Negative    Past Medical History[1]  Social History[2]  Current Medications[3]    BP (!) 142/51   Pulse 79   Ht 1.651 m (5' 5\")   Wt 108 kg (237 lb)   SpO2 95%   BMI 39.44 kg/m²     Physical Exam:   Gen:           Alert and oriented, No apparent distress. Mood and affect appropriate, normal interaction with examiner.   Hearing:     Grossly intact.  Nose:          Normal, no lesions or deformities.  Gait and Station: Normal.  Digits and Nails: No clubbing, cyanosis, petechiae, or nodes.   Skin:        Slightly erythematous right fourth toe without clear signs of infection or significant swelling                Ext:           No cyanosis or edema.    Assessment and Plan:     1. Hyperthyroidism (Primary)  Continue methimazole 5 mg daily  Follow-up in 4 months with labs    - TSH WITH REFLEX TO FT4; Future  - CBC WITH DIFFERENTIAL; Future  - Comp Metabolic Panel; Future  - TRIIDOTHYRONINE; Future  - HEMOGLOBIN A1C; Future  - methimazole (TAPAZOLE) 5 MG Tab; Take 1 Tablet by mouth every day for 180 doses.  Dispense: 90 Tablet; Refill: 1    2. Obesity, Class III, BMI 40-49.9 (morbid obesity)  Continue Ozempic 0.5 mg weekly    3. Prediabetes  Continue Ozempic 0.5 mg weekly  Refer to podiatry for erythematous right fourth toe  - HEMOGLOBIN A1C; Future      Orders Placed This Encounter    TSH WITH REFLEX TO FT4    CBC WITH DIFFERENTIAL    Comp Metabolic Panel    TRIIDOTHYRONINE    HEMOGLOBIN A1C    methimazole (TAPAZOLE) 5 MG Tab       No follow-ups on file.    Please note that this dictation was created using voice recognition software. I have made every reasonable attempt to correct obvious errors, but I expect that there are errors of grammar and possibly content " "that I did not discover before finalizing the note.     Newton Edmondson M.D.       [1]   Past Medical History:  Diagnosis Date    Anemia     Anesthesia     \"woke up during surgery\"    Arthritis     Asthma     Blood clotting disorder (HCC)     L leg    Bronchitis 2016    Heart burn     Pain     joint pain    Psychiatric problem     depression/anxiety    Urinary bladder disorder     Urinary incontinence    [2]   Social History  Tobacco Use    Smoking status: Some Days     Current packs/day: 0.50     Average packs/day: 0.5 packs/day for 10.0 years (5.0 ttl pk-yrs)     Types: Cigarettes    Smokeless tobacco: Never   Substance Use Topics    Alcohol use: Yes     Comment: 8 q month    Drug use: No   [3]   Current Outpatient Medications   Medication Sig Dispense Refill    methimazole (TAPAZOLE) 5 MG Tab Take 1 Tablet by mouth every day for 180 doses. 90 Tablet 1    Semaglutide,0.25 or 0.5MG/DOS, (OZEMPIC, 0.25 OR 0.5 MG/DOSE,) 2 MG/3ML Solution Pen-injector Inject 0.5 mg under the skin every 7 days for 12 doses. Start with 0.25 mg weekly for 4 weeks, then 0.5 mg weekly 9 mL 11    diclofenac sodium (VOLTAREN) 1 % Gel APPLY 2 G TOPICALLY 4 TIMES A DAY AS NEEDED (APPLY TO AFFECTED AREAS AS NEEDED.). 100 g 1    rizatriptan (MAXALT-MLT) 10 MG disintegrating tablet TAKE 1 TABLET BY MOUTH ONE TIME AS NEEDED FOR MIGRAINE FOR 1 DOSE, MAX 9/23DAYS 9 Tablet 1    sertraline (ZOLOFT) 100 MG Tab TAKE 1 TABLET BY MOUTH EVERY DAY 30 Tablet 2    traZODone (DESYREL) 50 MG Tab TAKE 2 TABLETS BY MOUTH AT BEDTIME AS NEEDED FOR SLEEP. 30 Tablet 2    meloxicam (MOBIC) 7.5 MG Tab TAKE 1 TABLET BY MOUTH EVERY DAY 30 Tablet 11    propranolol (INDERAL) 40 MG Tab TAKE 1 TABLET BY MOUTH TWICE A DAY 60 Tablet 2    omeprazole (PRILOSEC) 20 MG delayed-release capsule Take 1 Capsule by mouth every day. 90 Capsule 2    lisinopril (PRINIVIL) 10 MG Tab Take 1 Tablet by mouth every day. 90 Tablet 3    oxybutynin SR (DITROPAN-XL) 10 MG CR tablet Take 1 Tablet " by mouth every day. 30 Tablet 3    gabapentin (NEURONTIN) 300 MG Cap Take 600 mg by mouth 3 times a day.      cyclobenzaprine (FLEXERIL) 10 mg Tab Take 1 Tablet by mouth 3 times a day as needed for Moderate Pain. 90 Tablet 3    QVAR REDIHALER 40 MCG/ACT inhaler 1 Puff 2 times a day.      albuterol 108 (90 Base) MCG/ACT Aero Soln inhalation aerosol Inhale 1-2 Puffs by mouth 2 times a day as needed for Shortness of Breath.      budesonide (PULMICORT) 0.5 MG/2ML Suspension 500 mcg by Nebulization route 2 times a day.       No current facility-administered medications for this visit.

## 2025-06-11 NOTE — Clinical Note
REFERRAL APPROVAL NOTICE         Sent on June 11, 2025                   Luz Ahumada  [address]  [facility] [address]                   Dear Ms. Ahumada,    After a careful review of the medical information and benefit coverage, [facility] has processed your referral. See below for additional details.    If applicable, you must be actively enrolled with your insurance for coverage of the authorized service. If you have any questions regarding your coverage, please contact your insurance directly.    REFERRAL INFORMATION   Referral #:  [id number]  Referred-To Provider    Referred-By Provider:  Podiatry    Newton Edmondson M.D.   [facility]      [address]  [facility] [address]  [phone] [id number]  [id number]  [facility] [address]  [phone]    Referral Start Date:  06/07/2025  Referral End Date:   06/07/2026             SCHEDULING  If you do not already have an appointment, please call [phone] to make an appointment.     MORE INFORMATION  If you do not already have a "[employer] account, sign up at: [email or web address]  You can access your medical information, make appointments, see lab results, billing information, and more.  If you have questions regarding this referral, please contact  the [facility] Referrals department at:             [phone]. Monday - Friday 8:00AM - 5:00PM.     Sincerely,    [facility]

## 2025-08-05 ENCOUNTER — APPOINTMENT (OUTPATIENT)
Dept: ADMISSIONS | Facility: MEDICAL CENTER | Age: 65
DRG: 328 | End: 2025-08-05
Attending: SURGERY
Payer: MEDICAID

## 2025-08-08 ENCOUNTER — PRE-ADMISSION TESTING (OUTPATIENT)
Dept: ADMISSIONS | Facility: MEDICAL CENTER | Age: 65
DRG: 328 | End: 2025-08-08
Attending: SURGERY
Payer: MEDICAID

## 2025-08-08 RX ORDER — SEMAGLUTIDE 0.68 MG/ML
0.25 INJECTION, SOLUTION SUBCUTANEOUS
COMMUNITY
Start: 2025-07-23 | End: 2025-08-17 | Stop reason: SDUPTHER

## 2025-08-08 RX ORDER — TOLTERODINE 2 MG/1
CAPSULE, EXTENDED RELEASE ORAL
Status: ON HOLD | COMMUNITY
Start: 2024-02-24 | End: 2025-08-19

## 2025-08-13 ENCOUNTER — PRE-ADMISSION TESTING (OUTPATIENT)
Dept: ADMISSIONS | Facility: MEDICAL CENTER | Age: 65
DRG: 328 | End: 2025-08-13
Attending: SURGERY
Payer: MEDICAID

## 2025-08-13 DIAGNOSIS — Z01.810 PRE-OPERATIVE CARDIOVASCULAR EXAMINATION: ICD-10-CM

## 2025-08-13 DIAGNOSIS — Z01.812 PRE-OPERATIVE LABORATORY EXAMINATION: Primary | ICD-10-CM

## 2025-08-14 ENCOUNTER — APPOINTMENT (OUTPATIENT)
Dept: ADMISSIONS | Facility: MEDICAL CENTER | Age: 65
DRG: 328 | End: 2025-08-14
Attending: SURGERY
Payer: MEDICAID

## 2025-08-14 LAB
ANION GAP SERPL CALC-SCNC: 10 MMOL/L (ref 7–16)
BUN SERPL-MCNC: 12 MG/DL (ref 8–22)
CALCIUM SERPL-MCNC: 9.1 MG/DL (ref 8.5–10.5)
CHLORIDE SERPL-SCNC: 106 MMOL/L (ref 96–112)
CO2 SERPL-SCNC: 24 MMOL/L (ref 20–33)
CREAT SERPL-MCNC: 0.79 MG/DL (ref 0.5–1.4)
EKG IMPRESSION: NORMAL
ERYTHROCYTE [DISTWIDTH] IN BLOOD BY AUTOMATED COUNT: 52.4 FL (ref 35.9–50)
EST. AVERAGE GLUCOSE BLD GHB EST-MCNC: 105 MG/DL
GFR SERPLBLD CREATININE-BSD FMLA CKD-EPI: 83 ML/MIN/1.73 M 2
GLUCOSE SERPL-MCNC: 85 MG/DL (ref 65–99)
HBA1C MFR BLD: 5.3 % (ref 4–5.6)
HCT VFR BLD AUTO: 42.6 % (ref 37–47)
HGB BLD-MCNC: 13.3 G/DL (ref 12–16)
MCH RBC QN AUTO: 25.6 PG (ref 27–33)
MCHC RBC AUTO-ENTMCNC: 31.2 G/DL (ref 32.2–35.5)
MCV RBC AUTO: 82.1 FL (ref 81.4–97.8)
PLATELET # BLD AUTO: 215 K/UL (ref 164–446)
PMV BLD AUTO: 11 FL (ref 9–12.9)
POTASSIUM SERPL-SCNC: 3.5 MMOL/L (ref 3.6–5.5)
RBC # BLD AUTO: 5.19 M/UL (ref 4.2–5.4)
SODIUM SERPL-SCNC: 140 MMOL/L (ref 135–145)
WBC # BLD AUTO: 7.8 K/UL (ref 4.8–10.8)

## 2025-08-14 PROCEDURE — 85027 COMPLETE CBC AUTOMATED: CPT

## 2025-08-14 PROCEDURE — 83036 HEMOGLOBIN GLYCOSYLATED A1C: CPT

## 2025-08-14 PROCEDURE — 93010 ELECTROCARDIOGRAM REPORT: CPT | Performed by: INTERNAL MEDICINE

## 2025-08-14 PROCEDURE — 36415 COLL VENOUS BLD VENIPUNCTURE: CPT

## 2025-08-14 PROCEDURE — 93005 ELECTROCARDIOGRAM TRACING: CPT | Mod: TC

## 2025-08-14 PROCEDURE — 80048 BASIC METABOLIC PNL TOTAL CA: CPT

## 2025-08-17 DIAGNOSIS — E66.813 OBESITY, CLASS III, BMI 40-49.9 (MORBID OBESITY): ICD-10-CM

## 2025-08-17 DIAGNOSIS — R73.03 PREDIABETES: Primary | ICD-10-CM

## 2025-08-17 RX ORDER — SEMAGLUTIDE 0.68 MG/ML
0.5 INJECTION, SOLUTION SUBCUTANEOUS
Qty: 9 ML | Refills: 2 | Status: ON HOLD | OUTPATIENT
Start: 2025-08-17 | End: 2025-08-19

## 2025-08-18 ENCOUNTER — ANESTHESIA EVENT (OUTPATIENT)
Dept: SURGERY | Facility: MEDICAL CENTER | Age: 65
DRG: 328 | End: 2025-08-18
Payer: MEDICAID

## 2025-08-18 ENCOUNTER — ANESTHESIA (OUTPATIENT)
Dept: SURGERY | Facility: MEDICAL CENTER | Age: 65
DRG: 328 | End: 2025-08-18
Payer: MEDICAID

## 2025-08-18 ENCOUNTER — HOSPITAL ENCOUNTER (INPATIENT)
Facility: MEDICAL CENTER | Age: 65
LOS: 1 days | DRG: 328 | End: 2025-08-19
Attending: SURGERY | Admitting: SURGERY
Payer: MEDICAID

## 2025-08-18 DIAGNOSIS — G89.18 POSTOPERATIVE PAIN: Primary | ICD-10-CM

## 2025-08-18 DIAGNOSIS — R26.89 ANTALGIC GAIT: ICD-10-CM

## 2025-08-18 DIAGNOSIS — R32 URINARY INCONTINENCE, UNSPECIFIED TYPE: ICD-10-CM

## 2025-08-18 LAB
GLUCOSE BLD STRIP.AUTO-MCNC: 94 MG/DL (ref 65–99)
PATHOLOGY CONSULT NOTE: NORMAL

## 2025-08-18 PROCEDURE — 770001 HCHG ROOM/CARE - MED/SURG/GYN PRIV*

## 2025-08-18 PROCEDURE — 700105 HCHG RX REV CODE 258: Performed by: SURGERY

## 2025-08-18 PROCEDURE — 700111 HCHG RX REV CODE 636 W/ 250 OVERRIDE (IP): Mod: JZ | Performed by: STUDENT IN AN ORGANIZED HEALTH CARE EDUCATION/TRAINING PROGRAM

## 2025-08-18 PROCEDURE — 88307 TISSUE EXAM BY PATHOLOGIST: CPT | Performed by: PATHOLOGY

## 2025-08-18 PROCEDURE — 160015 HCHG STAT PREOP MINUTES: Performed by: SURGERY

## 2025-08-18 PROCEDURE — 88307 TISSUE EXAM BY PATHOLOGIST: CPT | Mod: 26 | Performed by: PATHOLOGY

## 2025-08-18 PROCEDURE — 700101 HCHG RX REV CODE 250: Performed by: SURGERY

## 2025-08-18 PROCEDURE — 82962 GLUCOSE BLOOD TEST: CPT | Performed by: SURGERY

## 2025-08-18 PROCEDURE — 88312 SPECIAL STAINS GROUP 1: CPT | Mod: 26 | Performed by: PATHOLOGY

## 2025-08-18 PROCEDURE — 700102 HCHG RX REV CODE 250 W/ 637 OVERRIDE(OP): Performed by: STUDENT IN AN ORGANIZED HEALTH CARE EDUCATION/TRAINING PROGRAM

## 2025-08-18 PROCEDURE — A9270 NON-COVERED ITEM OR SERVICE: HCPCS | Performed by: PHYSICIAN ASSISTANT

## 2025-08-18 PROCEDURE — 0DB68ZZ EXCISION OF STOMACH, VIA NATURAL OR ARTIFICIAL OPENING ENDOSCOPIC: ICD-10-PCS | Performed by: SURGERY

## 2025-08-18 PROCEDURE — 700111 HCHG RX REV CODE 636 W/ 250 OVERRIDE (IP): Mod: JZ | Performed by: PHYSICIAN ASSISTANT

## 2025-08-18 PROCEDURE — 700101 HCHG RX REV CODE 250: Performed by: STUDENT IN AN ORGANIZED HEALTH CARE EDUCATION/TRAINING PROGRAM

## 2025-08-18 PROCEDURE — 160002 HCHG RECOVERY MINUTES (STAT): Performed by: SURGERY

## 2025-08-18 PROCEDURE — 700111 HCHG RX REV CODE 636 W/ 250 OVERRIDE (IP): Performed by: STUDENT IN AN ORGANIZED HEALTH CARE EDUCATION/TRAINING PROGRAM

## 2025-08-18 PROCEDURE — 160029 HCHG SURGERY MINUTES - 1ST 30 MINS LEVEL 4: Performed by: SURGERY

## 2025-08-18 PROCEDURE — 160048 HCHG OR STATISTICAL LEVEL 1-5: Performed by: SURGERY

## 2025-08-18 PROCEDURE — C1781 MESH (IMPLANTABLE): HCPCS | Performed by: SURGERY

## 2025-08-18 PROCEDURE — A9270 NON-COVERED ITEM OR SERVICE: HCPCS | Performed by: STUDENT IN AN ORGANIZED HEALTH CARE EDUCATION/TRAINING PROGRAM

## 2025-08-18 PROCEDURE — 160193 HCHG PACU STANDARD - 1ST 60 MINS: Performed by: SURGERY

## 2025-08-18 PROCEDURE — 88312 SPECIAL STAINS GROUP 1: CPT | Performed by: PATHOLOGY

## 2025-08-18 PROCEDURE — 0BUT4JZ SUPPLEMENT DIAPHRAGM WITH SYNTHETIC SUBSTITUTE, PERCUTANEOUS ENDOSCOPIC APPROACH: ICD-10-PCS | Performed by: SURGERY

## 2025-08-18 PROCEDURE — 160192 HCHG ANESTHESIA COMPLEX: Performed by: SURGERY

## 2025-08-18 PROCEDURE — 700102 HCHG RX REV CODE 250 W/ 637 OVERRIDE(OP): Performed by: PHYSICIAN ASSISTANT

## 2025-08-18 PROCEDURE — 160041 HCHG SURGERY MINUTES - EA ADDL 1 MIN LEVEL 4: Performed by: SURGERY

## 2025-08-18 DEVICE — MESH SURGICAL PHASIX SEPRA 7X10CM: Type: IMPLANTABLE DEVICE | Site: ABDOMEN | Status: FUNCTIONAL

## 2025-08-18 RX ORDER — ONDANSETRON 4 MG/1
4 TABLET, ORALLY DISINTEGRATING ORAL EVERY 4 HOURS PRN
Status: DISCONTINUED | OUTPATIENT
Start: 2025-08-18 | End: 2025-08-19 | Stop reason: HOSPADM

## 2025-08-18 RX ORDER — ALBUTEROL SULFATE 5 MG/ML
2.5 SOLUTION RESPIRATORY (INHALATION)
Status: DISCONTINUED | OUTPATIENT
Start: 2025-08-18 | End: 2025-08-18 | Stop reason: HOSPADM

## 2025-08-18 RX ORDER — SODIUM CHLORIDE, SODIUM LACTATE, POTASSIUM CHLORIDE, CALCIUM CHLORIDE 600; 310; 30; 20 MG/100ML; MG/100ML; MG/100ML; MG/100ML
INJECTION, SOLUTION INTRAVENOUS CONTINUOUS
Status: ACTIVE | OUTPATIENT
Start: 2025-08-18 | End: 2025-08-18

## 2025-08-18 RX ORDER — HYDROMORPHONE HYDROCHLORIDE 1 MG/ML
0.4 INJECTION, SOLUTION INTRAMUSCULAR; INTRAVENOUS; SUBCUTANEOUS
Status: DISCONTINUED | OUTPATIENT
Start: 2025-08-18 | End: 2025-08-18 | Stop reason: HOSPADM

## 2025-08-18 RX ORDER — PROPRANOLOL HYDROCHLORIDE 10 MG/1
10 TABLET ORAL 3 TIMES DAILY PRN
COMMUNITY

## 2025-08-18 RX ORDER — HYDROMORPHONE HYDROCHLORIDE 1 MG/ML
.5-1 INJECTION, SOLUTION INTRAMUSCULAR; INTRAVENOUS; SUBCUTANEOUS
Status: DISCONTINUED | OUTPATIENT
Start: 2025-08-18 | End: 2025-08-19 | Stop reason: HOSPADM

## 2025-08-18 RX ORDER — ONDANSETRON 2 MG/ML
INJECTION INTRAMUSCULAR; INTRAVENOUS PRN
Status: DISCONTINUED | OUTPATIENT
Start: 2025-08-18 | End: 2025-08-18 | Stop reason: SURG

## 2025-08-18 RX ORDER — LABETALOL HYDROCHLORIDE 5 MG/ML
5 INJECTION, SOLUTION INTRAVENOUS
Status: DISCONTINUED | OUTPATIENT
Start: 2025-08-18 | End: 2025-08-18 | Stop reason: HOSPADM

## 2025-08-18 RX ORDER — ONDANSETRON 2 MG/ML
4 INJECTION INTRAMUSCULAR; INTRAVENOUS EVERY 4 HOURS PRN
Status: DISCONTINUED | OUTPATIENT
Start: 2025-08-18 | End: 2025-08-19 | Stop reason: HOSPADM

## 2025-08-18 RX ORDER — DEXMEDETOMIDINE HYDROCHLORIDE 100 UG/ML
INJECTION, SOLUTION INTRAVENOUS PRN
Status: DISCONTINUED | OUTPATIENT
Start: 2025-08-18 | End: 2025-08-18 | Stop reason: SURG

## 2025-08-18 RX ORDER — ENOXAPARIN SODIUM 100 MG/ML
40 INJECTION SUBCUTANEOUS DAILY
Status: DISCONTINUED | OUTPATIENT
Start: 2025-08-19 | End: 2025-08-19 | Stop reason: HOSPADM

## 2025-08-18 RX ORDER — HYDROMORPHONE HYDROCHLORIDE 2 MG/ML
INJECTION, SOLUTION INTRAMUSCULAR; INTRAVENOUS; SUBCUTANEOUS PRN
Status: DISCONTINUED | OUTPATIENT
Start: 2025-08-18 | End: 2025-08-18 | Stop reason: SURG

## 2025-08-18 RX ORDER — SIMETHICONE 125 MG
125 TABLET,CHEWABLE ORAL 3 TIMES DAILY PRN
Status: DISCONTINUED | OUTPATIENT
Start: 2025-08-18 | End: 2025-08-19 | Stop reason: HOSPADM

## 2025-08-18 RX ORDER — DIPHENHYDRAMINE HYDROCHLORIDE 50 MG/ML
12.5 INJECTION, SOLUTION INTRAMUSCULAR; INTRAVENOUS
Status: DISCONTINUED | OUTPATIENT
Start: 2025-08-18 | End: 2025-08-18 | Stop reason: HOSPADM

## 2025-08-18 RX ORDER — ACETAMINOPHEN 10 MG/ML
INJECTION, SOLUTION INTRAVENOUS PRN
Status: DISCONTINUED | OUTPATIENT
Start: 2025-08-18 | End: 2025-08-18 | Stop reason: SURG

## 2025-08-18 RX ORDER — ONDANSETRON 2 MG/ML
4 INJECTION INTRAMUSCULAR; INTRAVENOUS
Status: DISCONTINUED | OUTPATIENT
Start: 2025-08-18 | End: 2025-08-18 | Stop reason: HOSPADM

## 2025-08-18 RX ORDER — OXYCODONE HCL 5 MG/5 ML
10 SOLUTION, ORAL ORAL
Status: COMPLETED | OUTPATIENT
Start: 2025-08-18 | End: 2025-08-18

## 2025-08-18 RX ORDER — HYDROMORPHONE HYDROCHLORIDE 1 MG/ML
0.1 INJECTION, SOLUTION INTRAMUSCULAR; INTRAVENOUS; SUBCUTANEOUS
Status: DISCONTINUED | OUTPATIENT
Start: 2025-08-18 | End: 2025-08-18 | Stop reason: HOSPADM

## 2025-08-18 RX ORDER — SODIUM CHLORIDE, SODIUM LACTATE, POTASSIUM CHLORIDE, AND CALCIUM CHLORIDE .6; .31; .03; .02 G/100ML; G/100ML; G/100ML; G/100ML
500 INJECTION, SOLUTION INTRAVENOUS
Status: DISCONTINUED | OUTPATIENT
Start: 2025-08-18 | End: 2025-08-19 | Stop reason: HOSPADM

## 2025-08-18 RX ORDER — SERTRALINE HYDROCHLORIDE 100 MG/1
100 TABLET, FILM COATED ORAL
Status: DISCONTINUED | OUTPATIENT
Start: 2025-08-19 | End: 2025-08-19 | Stop reason: HOSPADM

## 2025-08-18 RX ORDER — BUPIVACAINE HYDROCHLORIDE AND EPINEPHRINE 5; 5 MG/ML; UG/ML
INJECTION, SOLUTION EPIDURAL; INTRACAUDAL; PERINEURAL
Status: DISCONTINUED | OUTPATIENT
Start: 2025-08-18 | End: 2025-08-18 | Stop reason: HOSPADM

## 2025-08-18 RX ORDER — OXYBUTYNIN CHLORIDE 5 MG/1
5 TABLET ORAL 2 TIMES DAILY
Status: DISCONTINUED | OUTPATIENT
Start: 2025-08-18 | End: 2025-08-19 | Stop reason: HOSPADM

## 2025-08-18 RX ORDER — ROCURONIUM BROMIDE 10 MG/ML
INJECTION, SOLUTION INTRAVENOUS PRN
Status: DISCONTINUED | OUTPATIENT
Start: 2025-08-18 | End: 2025-08-18 | Stop reason: SURG

## 2025-08-18 RX ORDER — HYDROMORPHONE HYDROCHLORIDE 1 MG/ML
0.2 INJECTION, SOLUTION INTRAMUSCULAR; INTRAVENOUS; SUBCUTANEOUS
Status: DISCONTINUED | OUTPATIENT
Start: 2025-08-18 | End: 2025-08-18 | Stop reason: HOSPADM

## 2025-08-18 RX ORDER — GABAPENTIN 300 MG/1
600 CAPSULE ORAL 3 TIMES DAILY PRN
Status: DISCONTINUED | OUTPATIENT
Start: 2025-08-18 | End: 2025-08-19 | Stop reason: HOSPADM

## 2025-08-18 RX ORDER — HYDRALAZINE HYDROCHLORIDE 20 MG/ML
5 INJECTION INTRAMUSCULAR; INTRAVENOUS
Status: DISCONTINUED | OUTPATIENT
Start: 2025-08-18 | End: 2025-08-18 | Stop reason: HOSPADM

## 2025-08-18 RX ORDER — ENALAPRILAT 1.25 MG/ML
2.5 INJECTION INTRAVENOUS EVERY 6 HOURS PRN
Status: DISCONTINUED | OUTPATIENT
Start: 2025-08-18 | End: 2025-08-19 | Stop reason: HOSPADM

## 2025-08-18 RX ORDER — CEFAZOLIN SODIUM 1 G/3ML
INJECTION, POWDER, FOR SOLUTION INTRAMUSCULAR; INTRAVENOUS PRN
Status: DISCONTINUED | OUTPATIENT
Start: 2025-08-18 | End: 2025-08-18 | Stop reason: SURG

## 2025-08-18 RX ORDER — HALOPERIDOL 5 MG/ML
1 INJECTION INTRAMUSCULAR
Status: DISCONTINUED | OUTPATIENT
Start: 2025-08-18 | End: 2025-08-18 | Stop reason: HOSPADM

## 2025-08-18 RX ORDER — PROPRANOLOL HYDROCHLORIDE 10 MG/1
10 TABLET ORAL 3 TIMES DAILY PRN
Status: DISCONTINUED | OUTPATIENT
Start: 2025-08-18 | End: 2025-08-19 | Stop reason: HOSPADM

## 2025-08-18 RX ORDER — AMLODIPINE BESYLATE 10 MG/1
10 TABLET ORAL DAILY
COMMUNITY

## 2025-08-18 RX ORDER — DEXAMETHASONE SODIUM PHOSPHATE 4 MG/ML
INJECTION, SOLUTION INTRA-ARTICULAR; INTRALESIONAL; INTRAMUSCULAR; INTRAVENOUS; SOFT TISSUE PRN
Status: DISCONTINUED | OUTPATIENT
Start: 2025-08-18 | End: 2025-08-18 | Stop reason: SURG

## 2025-08-18 RX ORDER — METHOCARBAMOL 100 MG/ML
1000 INJECTION, SOLUTION INTRAMUSCULAR; INTRAVENOUS EVERY 8 HOURS PRN
Status: DISCONTINUED | OUTPATIENT
Start: 2025-08-18 | End: 2025-08-19 | Stop reason: HOSPADM

## 2025-08-18 RX ORDER — ACETAMINOPHEN 10 MG/ML
1000 INJECTION, SOLUTION INTRAVENOUS EVERY 6 HOURS
Status: COMPLETED | OUTPATIENT
Start: 2025-08-18 | End: 2025-08-18

## 2025-08-18 RX ORDER — METHIMAZOLE 5 MG/1
5 TABLET ORAL DAILY
Status: DISCONTINUED | OUTPATIENT
Start: 2025-08-19 | End: 2025-08-19 | Stop reason: HOSPADM

## 2025-08-18 RX ORDER — KETOROLAC TROMETHAMINE 15 MG/ML
15 INJECTION, SOLUTION INTRAMUSCULAR; INTRAVENOUS EVERY 6 HOURS PRN
Status: DISCONTINUED | OUTPATIENT
Start: 2025-08-18 | End: 2025-08-19 | Stop reason: HOSPADM

## 2025-08-18 RX ORDER — OXYCODONE HCL 5 MG/5 ML
5-10 SOLUTION, ORAL ORAL EVERY 4 HOURS PRN
Refills: 0 | Status: DISCONTINUED | OUTPATIENT
Start: 2025-08-18 | End: 2025-08-19 | Stop reason: HOSPADM

## 2025-08-18 RX ORDER — CALCIUM CARBONATE 500 MG/1
500 TABLET, CHEWABLE ORAL 4 TIMES DAILY PRN
Status: DISCONTINUED | OUTPATIENT
Start: 2025-08-18 | End: 2025-08-19 | Stop reason: HOSPADM

## 2025-08-18 RX ORDER — HYDRALAZINE HYDROCHLORIDE 20 MG/ML
10 INJECTION INTRAMUSCULAR; INTRAVENOUS EVERY 6 HOURS PRN
Status: DISCONTINUED | OUTPATIENT
Start: 2025-08-18 | End: 2025-08-19 | Stop reason: HOSPADM

## 2025-08-18 RX ORDER — OXYCODONE HCL 5 MG/5 ML
5 SOLUTION, ORAL ORAL
Status: COMPLETED | OUTPATIENT
Start: 2025-08-18 | End: 2025-08-18

## 2025-08-18 RX ORDER — DIPHENHYDRAMINE HYDROCHLORIDE 50 MG/ML
12.5 INJECTION, SOLUTION INTRAMUSCULAR; INTRAVENOUS EVERY 6 HOURS PRN
Status: DISCONTINUED | OUTPATIENT
Start: 2025-08-18 | End: 2025-08-19 | Stop reason: HOSPADM

## 2025-08-18 RX ORDER — AMLODIPINE BESYLATE 10 MG/1
10 TABLET ORAL DAILY
Status: DISCONTINUED | OUTPATIENT
Start: 2025-08-19 | End: 2025-08-19 | Stop reason: HOSPADM

## 2025-08-18 RX ORDER — EPHEDRINE SULFATE 50 MG/ML
5 INJECTION, SOLUTION INTRAVENOUS
Status: DISCONTINUED | OUTPATIENT
Start: 2025-08-18 | End: 2025-08-18 | Stop reason: HOSPADM

## 2025-08-18 RX ORDER — PROMETHAZINE HYDROCHLORIDE 25 MG/1
25 SUPPOSITORY RECTAL EVERY 4 HOURS PRN
Status: DISCONTINUED | OUTPATIENT
Start: 2025-08-18 | End: 2025-08-19 | Stop reason: HOSPADM

## 2025-08-18 RX ORDER — LIDOCAINE HYDROCHLORIDE 20 MG/ML
INJECTION, SOLUTION EPIDURAL; INFILTRATION; INTRACAUDAL; PERINEURAL PRN
Status: DISCONTINUED | OUTPATIENT
Start: 2025-08-18 | End: 2025-08-18 | Stop reason: SURG

## 2025-08-18 RX ADMIN — PROPOFOL 20 MG: 10 INJECTION, EMULSION INTRAVENOUS at 10:52

## 2025-08-18 RX ADMIN — ACETAMINOPHEN 1000 MG: 1000 INJECTION INTRAVENOUS at 17:18

## 2025-08-18 RX ADMIN — DEXMEDETOMIDINE 10 MCG: 100 INJECTION, SOLUTION INTRAVENOUS at 09:51

## 2025-08-18 RX ADMIN — SUGAMMADEX 200 MG: 100 INJECTION, SOLUTION INTRAVENOUS at 10:52

## 2025-08-18 RX ADMIN — OXYCODONE HYDROCHLORIDE 5 MG: 5 SOLUTION ORAL at 19:37

## 2025-08-18 RX ADMIN — PROPOFOL 50 MCG/KG/MIN: 10 INJECTION, EMULSION INTRAVENOUS at 09:53

## 2025-08-18 RX ADMIN — SIMETHICONE 125 MG: 125 TABLET, CHEWABLE ORAL at 12:47

## 2025-08-18 RX ADMIN — SODIUM CHLORIDE, POTASSIUM CHLORIDE, SODIUM LACTATE AND CALCIUM CHLORIDE: 600; 310; 30; 20 INJECTION, SOLUTION INTRAVENOUS at 09:43

## 2025-08-18 RX ADMIN — ANTACID TABLETS 500 MG: 500 TABLET, CHEWABLE ORAL at 17:20

## 2025-08-18 RX ADMIN — HALOPERIDOL LACTATE 1 MG: 5 INJECTION, SOLUTION INTRAMUSCULAR at 11:10

## 2025-08-18 RX ADMIN — OXYCODONE HYDROCHLORIDE 10 MG: 5 SOLUTION ORAL at 11:14

## 2025-08-18 RX ADMIN — DEXAMETHASONE SODIUM PHOSPHATE 8 MG: 4 INJECTION INTRA-ARTICULAR; INTRALESIONAL; INTRAMUSCULAR; INTRAVENOUS; SOFT TISSUE at 09:51

## 2025-08-18 RX ADMIN — CEFAZOLIN 2 G: 1 INJECTION, POWDER, FOR SOLUTION INTRAMUSCULAR; INTRAVENOUS at 09:43

## 2025-08-18 RX ADMIN — FAMOTIDINE 20 MG: 10 INJECTION, SOLUTION INTRAVENOUS at 17:20

## 2025-08-18 RX ADMIN — ROCURONIUM BROMIDE 50 MG: 10 INJECTION INTRAVENOUS at 09:51

## 2025-08-18 RX ADMIN — OXYCODONE HYDROCHLORIDE 5 MG: 5 SOLUTION ORAL at 22:29

## 2025-08-18 RX ADMIN — OXYBUTYNIN CHLORIDE 5 MG: 5 TABLET ORAL at 17:20

## 2025-08-18 RX ADMIN — HYDROMORPHONE HYDROCHLORIDE 0.5 MG: 2 INJECTION INTRAMUSCULAR; INTRAVENOUS; SUBCUTANEOUS at 09:51

## 2025-08-18 RX ADMIN — LIDOCAINE HYDROCHLORIDE 60 MG: 20 INJECTION, SOLUTION EPIDURAL; INFILTRATION; INTRACAUDAL; PERINEURAL at 09:51

## 2025-08-18 RX ADMIN — FENTANYL CITRATE 50 MCG: 50 INJECTION, SOLUTION INTRAMUSCULAR; INTRAVENOUS at 09:51

## 2025-08-18 RX ADMIN — ACETAMINOPHEN 1 G: 1000 INJECTION INTRAVENOUS at 10:52

## 2025-08-18 RX ADMIN — DEXMEDETOMIDINE 5 MCG: 100 INJECTION, SOLUTION INTRAVENOUS at 10:30

## 2025-08-18 RX ADMIN — PROPOFOL 130 MG: 10 INJECTION, EMULSION INTRAVENOUS at 09:51

## 2025-08-18 RX ADMIN — ONDANSETRON 4 MG: 2 INJECTION INTRAMUSCULAR; INTRAVENOUS at 10:52

## 2025-08-18 RX ADMIN — PROPOFOL 20 MG: 10 INJECTION, EMULSION INTRAVENOUS at 10:46

## 2025-08-18 ASSESSMENT — LIFESTYLE VARIABLES
CONSUMPTION TOTAL: POSITIVE
SKIP TO QUESTIONS 9-10: 1
HOW MANY STANDARD DRINKS CONTAINING ALCOHOL DO YOU HAVE ON A TYPICAL DAY: 1 OR 2
ALCOHOL_USE: YES
HOW OFTEN DO YOU HAVE A DRINK CONTAINING ALCOHOL: 2-3 TIMES A WEEK
HOW OFTEN DO YOU HAVE SIX OR MORE DRINKS ON ONE OCCASION: NEVER
ON A TYPICAL DAY WHEN YOU DRINK ALCOHOL HOW MANY DRINKS DO YOU HAVE: 3
HAVE YOU EVER FELT YOU SHOULD CUT DOWN ON YOUR DRINKING: NO
AUDIT-C TOTAL SCORE: 3
TOTAL SCORE: 0
EVER HAD A DRINK FIRST THING IN THE MORNING TO STEADY YOUR NERVES TO GET RID OF A HANGOVER: NO
HAVE PEOPLE ANNOYED YOU BY CRITICIZING YOUR DRINKING: NO
HOW MANY TIMES IN THE PAST YEAR HAVE YOU HAD 5 OR MORE DRINKS IN A DAY: 0
TOTAL SCORE: 0
EVER FELT BAD OR GUILTY ABOUT YOUR DRINKING: NO
DOES PATIENT WANT TO STOP DRINKING: NO
AVERAGE NUMBER OF DAYS PER WEEK YOU HAVE A DRINK CONTAINING ALCOHOL: 3
TOTAL SCORE: 0

## 2025-08-18 ASSESSMENT — COGNITIVE AND FUNCTIONAL STATUS - GENERAL
TURNING FROM BACK TO SIDE WHILE IN FLAT BAD: A LITTLE
HELP NEEDED FOR BATHING: A LITTLE
DRESSING REGULAR LOWER BODY CLOTHING: A LITTLE
WALKING IN HOSPITAL ROOM: A LITTLE
TOILETING: A LITTLE
SUGGESTED CMS G CODE MODIFIER DAILY ACTIVITY: CJ
MOVING FROM LYING ON BACK TO SITTING ON SIDE OF FLAT BED: A LITTLE
MOVING TO AND FROM BED TO CHAIR: A LITTLE
DAILY ACTIVITIY SCORE: 21
STANDING UP FROM CHAIR USING ARMS: A LITTLE
CLIMB 3 TO 5 STEPS WITH RAILING: A LITTLE
MOBILITY SCORE: 18
SUGGESTED CMS G CODE MODIFIER MOBILITY: CK

## 2025-08-18 ASSESSMENT — PATIENT HEALTH QUESTIONNAIRE - PHQ9
SUM OF ALL RESPONSES TO PHQ9 QUESTIONS 1 AND 2: 0
2. FEELING DOWN, DEPRESSED, IRRITABLE, OR HOPELESS: NOT AT ALL
1. LITTLE INTEREST OR PLEASURE IN DOING THINGS: NOT AT ALL

## 2025-08-18 ASSESSMENT — PAIN DESCRIPTION - PAIN TYPE
TYPE: ACUTE PAIN

## 2025-08-18 ASSESSMENT — PAIN SCALES - GENERAL: PAIN_LEVEL: 3

## 2025-08-19 ENCOUNTER — PATIENT OUTREACH (OUTPATIENT)
Dept: SCHEDULING | Facility: IMAGING CENTER | Age: 65
End: 2025-08-19
Payer: MEDICAID

## 2025-08-19 ENCOUNTER — PHARMACY VISIT (OUTPATIENT)
Dept: PHARMACY | Facility: MEDICAL CENTER | Age: 65
End: 2025-08-19
Payer: COMMERCIAL

## 2025-08-19 VITALS
OXYGEN SATURATION: 90 % | WEIGHT: 229.5 LBS | HEART RATE: 74 BPM | SYSTOLIC BLOOD PRESSURE: 171 MMHG | DIASTOLIC BLOOD PRESSURE: 80 MMHG | BODY MASS INDEX: 38.24 KG/M2 | TEMPERATURE: 97.7 F | RESPIRATION RATE: 16 BRPM | HEIGHT: 65 IN

## 2025-08-19 PROCEDURE — RXMED WILLOW AMBULATORY MEDICATION CHARGE: Performed by: PHYSICIAN ASSISTANT

## 2025-08-19 PROCEDURE — A9270 NON-COVERED ITEM OR SERVICE: HCPCS | Performed by: PHYSICIAN ASSISTANT

## 2025-08-19 PROCEDURE — 700111 HCHG RX REV CODE 636 W/ 250 OVERRIDE (IP): Mod: JZ | Performed by: PHYSICIAN ASSISTANT

## 2025-08-19 PROCEDURE — 700102 HCHG RX REV CODE 250 W/ 637 OVERRIDE(OP): Performed by: PHYSICIAN ASSISTANT

## 2025-08-19 RX ORDER — OXYBUTYNIN CHLORIDE 5 MG/1
5 TABLET ORAL 2 TIMES DAILY
Qty: 30 TABLET | Refills: 0 | Status: SHIPPED | OUTPATIENT
Start: 2025-08-19

## 2025-08-19 RX ORDER — OXYCODONE HYDROCHLORIDE 5 MG/1
5 TABLET ORAL EVERY 6 HOURS PRN
Qty: 12 TABLET | Refills: 0 | Status: SHIPPED | OUTPATIENT
Start: 2025-08-19 | End: 2025-08-22

## 2025-08-19 RX ADMIN — FAMOTIDINE 20 MG: 10 INJECTION, SOLUTION INTRAVENOUS at 04:12

## 2025-08-19 RX ADMIN — OXYCODONE HYDROCHLORIDE 10 MG: 5 SOLUTION ORAL at 06:27

## 2025-08-19 RX ADMIN — METHOCARBAMOL 1000 MG: 100 INJECTION INTRAMUSCULAR; INTRAVENOUS at 08:13

## 2025-08-19 RX ADMIN — OXYCODONE HYDROCHLORIDE 5 MG: 5 SOLUTION ORAL at 10:30

## 2025-08-19 RX ADMIN — OXYBUTYNIN CHLORIDE 5 MG: 5 TABLET ORAL at 04:12

## 2025-08-19 RX ADMIN — AMLODIPINE BESYLATE 10 MG: 10 TABLET ORAL at 08:12

## 2025-08-19 RX ADMIN — KETOROLAC TROMETHAMINE 15 MG: 15 INJECTION, SOLUTION INTRAMUSCULAR; INTRAVENOUS at 02:14

## 2025-08-19 RX ADMIN — SIMETHICONE 125 MG: 125 TABLET, CHEWABLE ORAL at 10:30

## 2025-08-19 RX ADMIN — METHIMAZOLE 5 MG: 5 TABLET ORAL at 08:13

## 2025-08-19 ASSESSMENT — ENCOUNTER SYMPTOMS
HEARTBURN: 0
CHILLS: 0
NAUSEA: 0
ABDOMINAL PAIN: 1
SHORTNESS OF BREATH: 0
FEVER: 0
VOMITING: 0

## 2025-08-19 ASSESSMENT — PAIN DESCRIPTION - PAIN TYPE
TYPE: ACUTE PAIN

## (undated) DEVICE — SUTURE 2-0 30CM STRATAFIX SPIRAL PDO ***WAS PART #SXPD1B401 *****

## (undated) DEVICE — SODIUM CHL IRRIGATION 0.9% 1000ML (12EA/CA)

## (undated) DEVICE — SLEEVE, VASO, THIGH, MED

## (undated) DEVICE — ENDOSTITCH LOAD UNIT 0 SURGI - 12/CA

## (undated) DEVICE — CANNULA W/SEAL 5X100 Z-THRE - ADED KII (12/BX)

## (undated) DEVICE — SUTURE GENERAL

## (undated) DEVICE — SUTURE 0 ETHIBOND CT-2 C/R 8 X 18 (12PK/BX)"

## (undated) DEVICE — HEAD HOLDER JUNIOR/ADULT

## (undated) DEVICE — GOWN WARMING STANDARD FLEX - (30/CA)

## (undated) DEVICE — Device

## (undated) DEVICE — KIT ROOM DECONTAMINATION

## (undated) DEVICE — NEPTUNE 4 PORT MANIFOLD - (20/PK)

## (undated) DEVICE — GLOVE BIOGEL PI ORTHO SZ 7 PF LF (40PR/BX)

## (undated) DEVICE — RELOAD WITH GRIPPING SURFACE TECHNOLOGY GOLD 60MM (12EA/BX)

## (undated) DEVICE — DRESSING TRANSPARENT FILM TEGADERM 2.375 X 2.75" (100EA/BX)"

## (undated) DEVICE — ENDOSTITCH10MM SUTURING DEVIC - (3/CA)

## (undated) DEVICE — TROCAR Z THREAD12MM OPTICAL - NON BLADED (6/BX)

## (undated) DEVICE — CHLORAPREP 26 ML APPLICATOR - ORANGE TINT(25/CA)

## (undated) DEVICE — BLADE SURGICAL #10 - (50/BX)

## (undated) DEVICE — SLEEVE VASO DVT COMPRESSION CALF MED - (10PR/CA)

## (undated) DEVICE — GLOVE BIOGEL PI INDICATOR SZ 8.0 SURGICAL PF LF -(50/BX 4BX/CA)

## (undated) DEVICE — MASK ANESTHESIA ADULT  - (100/CA)

## (undated) DEVICE — PAD LAP STERILE 18 X 18 - (5/PK 40PK/CA)

## (undated) DEVICE — GOWN SURGEONS X-LARGE - DISP. (30/CA)

## (undated) DEVICE — SUTURE 2-0 VICRYL PLUS CT-2 - 27 INCH (36/BX)

## (undated) DEVICE — SUTURE 4-0 VICRYL PLUS FS-2 - 27 INCH (36/BX)

## (undated) DEVICE — GLOVE SZ 6 BIOGEL PI MICRO - PF LF (50PR/BX 4BX/CA)

## (undated) DEVICE — SET EXTENSION WITH 2 PORTS (48EA/CA) ***PART #2C8610 IS A SUBSTITUTE*****

## (undated) DEVICE — ELECTRODE DUAL RETURN W/ CORD - (50/PK)

## (undated) DEVICE — TUBE NG SALEM SUMP 16FR (50EA/CA)

## (undated) DEVICE — TROCAR Z THREAD11MM OPTICAL - NON BLADED(6/BX)

## (undated) DEVICE — CANISTER SUCTION 3000ML MECHANICAL FILTER AUTO SHUTOFF MEDI-VAC NONSTERILE LF DISP (40EA/CA)

## (undated) DEVICE — GLOVE BIOGEL SZ 7.5 SURGICAL PF LTX - (50PR/BX 4BX/CA)

## (undated) DEVICE — PROTECTOR ULNA NERVE - (36PR/CA)

## (undated) DEVICE — TROCAR SEPARATOR 15MMZTHREAD - (6/BX)

## (undated) DEVICE — SLEEVE, VASO, REPROC, LARGE

## (undated) DEVICE — GLOVE BIOGEL M SZ 8 SURGICAL PF LTX - (50/BX 4BX/CA)

## (undated) DEVICE — BAG SPONGE COUNT 10.25 X 32 - BLUE (250/CA)

## (undated) DEVICE — TROCAR 5X100 NON BLADED Z-TH - READ KII (6/BX)

## (undated) DEVICE — BLADE SURGICAL CLIPPER - (50EA/CA)

## (undated) DEVICE — SET LEADWIRE 5 LEAD BEDSIDE DISPOSABLE ECG (1SET OF 5/EA)

## (undated) DEVICE — TOWELS CLOTH SURGICAL - (4/PK 20PK/CA)

## (undated) DEVICE — TUBE E-T HI-LO CUFF 6.5MM (10EA/BX)

## (undated) DEVICE — NEEDLE INSFL 120MM 14GA VRRS - (20/BX)

## (undated) DEVICE — GLOVE BIOGEL PI INDICATOR SZ 6.5 SURGICAL PF LF - (50/BX 4BX/CA)

## (undated) DEVICE — SUCTION INSTRUMENT YANKAUER BULBOUS TIP W/O VENT (50EA/CA)

## (undated) DEVICE — LACTATED RINGERS INJ 1000 ML - (14EA/CA 60CA/PF)

## (undated) DEVICE — CANISTER SUCTION 3000ML MECHANICAL FILTER AUTO SHUTOFF MEDI-VAC NONSTERILE LF DISP  (40EA/CA)

## (undated) DEVICE — HUMID-VENT HEAT AND MOISTURE EXCHANGE- (50/BX)

## (undated) DEVICE — KIT ANESTHESIA W/CIRCUIT & 3/LT BAG W/FILTER (20EA/CA)

## (undated) DEVICE — TUBING CLEARLINK DUO-VENT - C-FLO (48EA/CA)

## (undated) DEVICE — ELECTRODE 850 FOAM ADHESIVE - HYDROGEL RADIOTRNSPRNT (50/PK)

## (undated) DEVICE — SYRINGE 50ML CATHETER TIP (40EA/BX 4BX/CA)

## (undated) DEVICE — COVER LIGHT HANDLE ALC PLUS DISP (18EA/BX)

## (undated) DEVICE — RELOAD WITH GRIPPING SURGACE TECHNOLOGY GREEN 60MM (12EA/BX)

## (undated) DEVICE — MAT PATIENT POSITIONING PREVALON (10EA/CA)

## (undated) DEVICE — SPONGE GAUZESTER. 2X2 4-PL - (2/PK 50PK/BX 30BX/CS)

## (undated) DEVICE — GLOVE BIOGEL INDICATOR SZ 8 SURGICAL PF LTX - (50/BX 4BX/CA)

## (undated) DEVICE — SEALER VESSEL HARMONIC ACE PLUS WITH ADVANCED HEMOSTASIS 36CM (1/EA)

## (undated) DEVICE — SUTURE 3-0 VICRYL PLUS SH - 27 INCH (36/BX)

## (undated) DEVICE — SENSOR SPO2 NEO LNCS ADHESIVE (20/BX) SEE USER NOTES

## (undated) DEVICE — TUBING LAPAROSCOPIC PLUME DEVICE (10EA/CA)

## (undated) DEVICE — SUTURE 1 ETHIBOND CTX C/R 8-1 - (12/BX)

## (undated) DEVICE — SENSOR OXIMETER ADULT SPO2 RD SET (20EA/BX)

## (undated) DEVICE — GOWN WARMING X-LARGE FLEX - (20/CA)

## (undated) DEVICE — STAPLER ECHELON 3000 60MM STANDARD (3EA/BX)

## (undated) DEVICE — BINDER ABDOMINAL 30X45X12IN - FITS 30-45IN WAIST 12IN HIGH